# Patient Record
Sex: FEMALE | Employment: FULL TIME | URBAN - METROPOLITAN AREA
[De-identification: names, ages, dates, MRNs, and addresses within clinical notes are randomized per-mention and may not be internally consistent; named-entity substitution may affect disease eponyms.]

---

## 2017-10-27 ENCOUNTER — HOSPITAL ENCOUNTER (OUTPATIENT)
Dept: GENERAL RADIOLOGY | Age: 42
Discharge: HOME | End: 2017-10-27
Payer: COMMERCIAL

## 2017-10-27 ENCOUNTER — OFFICE VISIT (OUTPATIENT)
Dept: INTERNAL MEDICINE CLINIC | Age: 42
End: 2017-10-27

## 2017-10-27 VITALS
HEART RATE: 85 BPM | WEIGHT: 253 LBS | DIASTOLIC BLOOD PRESSURE: 75 MMHG | HEIGHT: 64 IN | SYSTOLIC BLOOD PRESSURE: 121 MMHG | OXYGEN SATURATION: 96 % | BODY MASS INDEX: 43.19 KG/M2 | TEMPERATURE: 98.2 F | RESPIRATION RATE: 22 BRPM

## 2017-10-27 DIAGNOSIS — M79.671 HEEL PAIN, BILATERAL: Primary | ICD-10-CM

## 2017-10-27 DIAGNOSIS — F10.20 UNCOMPLICATED ALCOHOL DEPENDENCE (HCC): ICD-10-CM

## 2017-10-27 DIAGNOSIS — R73.03 PRE-DIABETES: ICD-10-CM

## 2017-10-27 DIAGNOSIS — K75.81 NASH (NONALCOHOLIC STEATOHEPATITIS): ICD-10-CM

## 2017-10-27 DIAGNOSIS — F17.200 NICOTINE DEPENDENCE, UNCOMPLICATED, UNSPECIFIED NICOTINE PRODUCT TYPE: ICD-10-CM

## 2017-10-27 DIAGNOSIS — M79.672 HEEL PAIN, BILATERAL: ICD-10-CM

## 2017-10-27 DIAGNOSIS — M79.671 HEEL PAIN, BILATERAL: ICD-10-CM

## 2017-10-27 DIAGNOSIS — M79.672 HEEL PAIN, BILATERAL: Primary | ICD-10-CM

## 2017-10-27 PROCEDURE — 73630 X-RAY EXAM OF FOOT: CPT

## 2017-10-27 NOTE — PROGRESS NOTES
INTERNISTS [facility]:  11/3/2017, MRN: [id number]      Elizabeth Prado is a 46 y.o. female and presents to clinic for Foot Pain (bilatreral heal pain wants a referral)    Subjective: The patient is a 42-year-old female with history of diabetes, IBS, nicotine dependence (smoking since the age of 14, 1pack lasts 3 days), alcoholic beverage consumption (can drink up to 12 beers in one day) and ARCINIEGA. 1. Bilateral heel pain: The patient reports bilateral heel pain with application of pressure to her foot. It hurts to walk on a hard floor. Symptoms began in the past 2 weeks. She did a cancer walk just 2 weeks ago just before the onset of symptoms. At times, she feels that her ankle may give out. Although she has pain in both feet, her left foot hurts more than her right. She has no history of trauma or falls. Symptoms improved with walking/stretching. Pain is achy. This is never happened before. 2. Health maintenance: The patient has a history of smoking since the age of 14. She denies shortness of breath and cough. She also has a history of excessive alcohol beverage consumption, consuming up to 12 years and 1 day. She also has a history of prediabetes and ARCINIEGA >3 months. Her weight increased from December of last year. Her weight today is 253 pounds. She is not regularly exercising or adhering to a restricted diet.     Wt Readings from Last 3 Encounters:   10/27/17 253 lb (114.8 kg)   12/09/16 244 lb 6.4 oz (110.9 kg)   06/16/16 244 lb 6.4 oz (110.9 kg)       Patient Active Problem List    Diagnosis Date Noted    ARCINIEGA (nonalcoholic steatohepatitis) 02/10/2015    Alcohol dependence ([address] 02/10/2015    Pre-diabetes 02/10/2015    IBS (irritable bowel syndrome) 02/02/2015       No Known Allergies    Past Medical History:   Diagnosis Date    [facility]    Elevated LFTs     EtOH dependence ([address]     Fatty liver     Irritable bowel syndrome (IBS)     2012, normal colonoscopy    Smoker        Past Surgical History:   Procedure Laterality Date    HX APPENDECTOMY  age 18    HX CHOLECYSTECTOMY  20    HX COLONOSCOPY  2012    normal    HX GYN  2007    BTL       Family History   Problem Relation Age of Onset    Adopted: Yes    Diabetes Maternal Grandmother        Social History   Substance Use Topics    Smoking status: Current Some Day Smoker     Packs/day: 1.00     Years: 15.00     Types: Cigarettes    Smokeless tobacco: Never Used    Alcohol use 12.0 oz/week     24 Cans of beer per week      Comment: 6 pack 4 times a week       ROS   Review of Systems   Constitutional: Negative for chills and fever. HENT: Negative for ear pain and sore throat. Eyes: Negative for blurred vision and pain. Respiratory: Negative for shortness of breath. Cardiovascular: Negative for chest pain. Gastrointestinal: Negative for abdominal pain, blood in stool and melena. Genitourinary: Negative for dysuria and hematuria. Musculoskeletal: Positive for joint pain. Negative for myalgias. Skin: Negative for rash. Neurological: Negative for tingling, focal weakness and headaches. Endo/Heme/Allergies: Does not bruise/bleed easily. Psychiatric/Behavioral: Negative for substance abuse. Objective     Vitals:    10/27/17 1212   BP: 121/75   Pulse: 85   Resp: 22   Temp: 98.2 °F (36.8 °C)   SpO2: 96%   Weight: 253 lb (114.8 kg)   Height: 5' 4\" (1.626 m)   PainSc:   3   PainLoc: Foot   LMP: 10/02/2017       Physical Exam   Constitutional: She is oriented to person, place, and time and well-developed, well-nourished, and in no distress. HENT:   Head: Normocephalic and atraumatic. Right Ear: External ear normal.   Left Ear: External ear normal.   Nose: Nose normal.   Mouth/Throat: Oropharynx is clear and moist. No oropharyngeal exudate. Clear TMs   Eyes: Conjunctivae and EOM are normal. Right eye exhibits no discharge. Left eye exhibits no discharge.  No scleral icterus. Neck: Neck supple. Cardiovascular: Normal rate, regular rhythm, normal heart sounds and intact distal pulses. Exam reveals no gallop and no friction rub. No murmur heard. Pulmonary/Chest: Effort normal and breath sounds normal. No respiratory distress. She has no wheezes. She has no rales. Abdominal: Soft. Bowel sounds are normal. She exhibits no distension. There is no tenderness. There is no rebound and no guarding. Musculoskeletal: She exhibits no edema or tenderness (bue are NTTP). Pt has pain along b/l heels/calcaneus. No pain along her plantar fascia or Achilles tendon. Good passive ROM along feet. No effusions are present. Lymphadenopathy:     She has no cervical adenopathy. Neurological: She is alert and oriented to person, place, and time. She exhibits normal muscle tone. Gait normal.   Skin: Skin is warm and dry. No erythema. Psychiatric: Affect normal.   Nursing note and vitals reviewed.       LABS   Data Review:   Lab Results   Component Value Date/Time    WBC 7.3 06/15/2016 09:05 AM    HGB 14.2 06/15/2016 09:05 AM    HCT 42.4 06/15/2016 09:05 AM    PLATELET 502 99/77/5875 09:05 AM    MCV 94.9 06/15/2016 09:05 AM       Lab Results   Component Value Date/Time    Sodium 136 06/15/2016 09:05 AM    Potassium 4.2 06/15/2016 09:05 AM    Chloride 103 06/15/2016 09:05 AM    CO2 26 06/15/2016 09:05 AM    Anion gap 7 06/15/2016 09:05 AM    Glucose 110 06/15/2016 09:05 AM    BUN 10 06/15/2016 09:05 AM    Creatinine 0.72 06/15/2016 09:05 AM    BUN/Creatinine ratio 14 06/15/2016 09:05 AM    GFR est AA >60 06/15/2016 09:05 AM    GFR est non-AA >60 06/15/2016 09:05 AM    Calcium 8.9 06/15/2016 09:05 AM       Lab Results   Component Value Date/Time    Cholesterol, total 167 06/15/2016 09:05 AM    HDL Cholesterol 66 06/15/2016 09:05 AM    LDL, calculated 80.4 06/15/2016 09:05 AM    VLDL, calculated 20.6 06/15/2016 09:05 AM    Triglyceride 103 06/15/2016 09:05 AM    CHOL/HDL Ratio 2.5 06/15/2016 09:05 AM       Lab Results   Component Value Date/Time    Hemoglobin A1c 5.6 06/15/2016 09:05 AM       Assessment/Plan:   Heel pain, bilateral  -Ordering bilateral foot x-rays to rule out stress fracture.  -Placing a referral to the podiatry team for assistance  -Activity as tolerated. ORDERS:  - XR FOOT RT MIN 3 V; Future  - XR FOOT LT MIN 3 V; Future  - REFERRAL TO PODIATRY    2.  Health maintenance:  -I encourage patient to drink alcoholic beverages in moderation.  -I encouraged patient to stop smoking. We discussed briefly the health benefits.  -I encouraged patient to maintain a heart healthy low-carb diet with regular aerobic exercise as a means to reduce her weight and overall lifetime CVD risk.  -Checking an A1c, CMP, CBC, and lipid panel given history of ARCINIEGA, prediabetes, obesity, and alcohol beverage consumption. Health Maintenance Due   Topic Date Due    PAP AKA CERVICAL CYTOLOGY  03/01/2018     Lab review: labs are reviewed in the EHR    I have discussed the diagnosis with the patient and the intended plan as seen in the above orders. The patient has received an after-visit summary and questions were answered concerning future plans. I have discussed medication side effects and warnings with the patient as well. I have reviewed the plan of care with the patient, accepted their input and they are in agreement with the treatment goals. All questions were answered. The patient understands the plan of care. Handouts provided today with above information. Pt instructed if symptoms worsen to call the office or report to the ED for continued care. Greater than 50% of the visit time was spent in counseling and/or coordination of care. The patient was counseled on the dangers of tobacco use, and was advised to quit. Reviewed strategies to maximize success, including substitution of other forms of reinforcement.       Follow-up Disposition:  Return if symptoms worsen or fail to improve.     Serena Cedillo MD

## 2017-10-27 NOTE — MR AVS SNAPSHOT
Visit Information Date & Time Provider Department Dept. Phone Encounter #  
 10/27/2017 12:00 PM Kirk Neri MD [facility] [id number] Your Appointments 1/4/2018  7:50 AM  
LAB with [facility] NURSE VISIT [facility] [address], [address]  [address] [address]  
  
   
 [address], [address] Upcoming Health Maintenance Date Due  
 PAP AKA CERVICAL CYTOLOGY 3/1/2018 DTaP/Tdap/Td series (2 - Td) 10/27/2027 Allergies as of 10/27/2017  Review Complete On: 10/27/2017 By: Milena Kaplan LPN No Known Allergies Current Immunizations  Never Reviewed No immunizations on file. Not reviewed this visit You Were Diagnosed With   
  
 Codes Comments Heel pain, bilateral    -  Primary ICD-10-CM: M79.671, H33.205 ICD-9-CM: 729.5 Vitals BP Pulse Temp Resp Height(growth percentile) Weight(growth percentile) 121/75 85 98.2 °F (36.8 °C) 22 5' 4\" (1.626 m) 253 lb (114.8 kg) LMP SpO2 BMI OB Status Smoking Status 10/02/2017 96% 43.43 kg/m2 Having regular periods Current Some Day Smoker Vitals History BMI and BSA Data Body Mass Index Body Surface Area  
 43.43 kg/m 2 2.28 m 2 Preferred Pharmacy Pharmacy Name Phone [facility] [address] [place] [address] [phone] Your Updated Medication List  
  
   
This list is accurate as of: 10/27/17 12:58 PM.  Always use your most recent med list.  
  
  
  
  
 azithromycin 250 mg tablet Commonly known as:  Susan Malik Take 500mg (2 tabs) on Day 1 and then 250mg (1 tab) daily on Days 2-5.  
  
 benzonatate 100 mg capsule Commonly known as:  TESSALON Take 1 Cap by mouth three (3) times daily as needed for Cough.   
  
 THERAFLU COLD & SORE THROAT PO  
 Take  by mouth two (2) times daily as needed. We Performed the Following REFERRAL TO PODIATRY [REF90 Custom] To-Do List   
 10/27/2017 Imaging:  XR FOOT LT MIN 3 V   
  
 10/27/2017 Imaging:  XR FOOT RT MIN 3 V Referral Information Referral ID Referred By Referred To  
  
 [id number] Yu Paredes Not Available Visits Status Start Date End Date 1 New Request 10/27/17 10/27/18 If your referral has a status of pending review or denied, additional information will be sent to support the outcome of this decision. Introducing [facility]! Kathrine Walton introduces light patient portal. Now you can access parts of your medical record, email your doctor's office, and request medication refills online. 1. In your internet browser, go to [email or web address] 2. Click on the First Time User? Click Here link in the Sign In box. You will see the New Member Sign Up page. 3. Enter your light Access Code exactly as it appears below. You will not need to use this code after youve completed the sign-up process. If you do not sign up before the expiration date, you must request a new code. · light Access Code: [email or web address] Expires: 1/1/2018 10:45 AM 
 
4. Enter the last four digits of your Social Security Number (xxxx) and Date of Birth (mm/dd/yyyy) as indicated and click Submit. You will be taken to the next sign-up page. 5. Create a light ID. This will be your light login ID and cannot be changed, so think of one that is secure and easy to remember. 6. Create a light password. You can change your password at any time. 7. Enter your Password Reset Question and Answer. This can be used at a later time if you forget your password. 8. Enter your e-mail address. You will receive e-mail notification when new information is available in [address] 9. Click Sign Up.  You can now view and download portions of your medical record. 10. Click the Download Summary menu link to download a portable copy of your medical information. If you have questions, please visit the Frequently Asked Questions section of the [email or web address] website. Remember, [email or web address] is NOT to be used for urgent needs. For medical emergencies, dial 911. Now available from your iPhone and Android! Please provide this summary of care documentation to your next provider. Your primary care clinician is listed as Kelly Sarmiento. If you have any questions after today's visit, please call [phone].

## 2017-10-27 NOTE — ACP (ADVANCE CARE PLANNING)
1. Have you been to the ER, urgent care clinic since your last visit? Hospitalized since your last visit? No    2. Have you seen or consulted any other health care providers outside of the [address] [place] [address] since your last visit? Include any pap smears or colon screening.  No

## 2017-10-27 NOTE — PROGRESS NOTES
Pt notified of her results. Her podiatry apt is already scheduled for next week.      Dr. Mague Newton  [facility], [address] [place] [address] Str.  Phone: [phone]  Fax: [phone]

## 2017-11-03 PROBLEM — F17.200 NICOTINE DEPENDENCE: Status: ACTIVE | Noted: 2017-11-03

## 2017-12-06 ENCOUNTER — TELEPHONE (OUTPATIENT)
Dept: INTERNAL MEDICINE CLINIC | Age: 42
End: 2017-12-06

## 2017-12-06 NOTE — TELEPHONE ENCOUNTER
Patient calling says she has a cough that's driving her crazy, she cant sleep and the cough is all day long. Would like to know if JM will call her in a prescription for some cough syrup.   Pls Advise

## 2018-03-15 ENCOUNTER — OFFICE VISIT (OUTPATIENT)
Dept: INTERNAL MEDICINE CLINIC | Age: 43
End: 2018-03-15

## 2018-03-15 ENCOUNTER — HOSPITAL ENCOUNTER (OUTPATIENT)
Dept: LAB | Age: 43
Discharge: HOME | End: 2018-03-15
Payer: COMMERCIAL

## 2018-03-15 VITALS
WEIGHT: 251 LBS | RESPIRATION RATE: 18 BRPM | HEART RATE: 67 BPM | OXYGEN SATURATION: 96 % | SYSTOLIC BLOOD PRESSURE: 125 MMHG | HEIGHT: 64 IN | TEMPERATURE: 96.7 F | DIASTOLIC BLOOD PRESSURE: 78 MMHG | BODY MASS INDEX: 42.85 KG/M2

## 2018-03-15 DIAGNOSIS — Z13.220 SCREENING FOR HYPERLIPIDEMIA: ICD-10-CM

## 2018-03-15 DIAGNOSIS — J98.8 RESPIRATORY TRACT INFECTION: ICD-10-CM

## 2018-03-15 DIAGNOSIS — R73.03 PRE-DIABETES: ICD-10-CM

## 2018-03-15 DIAGNOSIS — K75.81 NASH (NONALCOHOLIC STEATOHEPATITIS): ICD-10-CM

## 2018-03-15 DIAGNOSIS — F17.200 NICOTINE DEPENDENCE, UNCOMPLICATED, UNSPECIFIED NICOTINE PRODUCT TYPE: ICD-10-CM

## 2018-03-15 DIAGNOSIS — J34.0 NOSE CELLULITIS: Primary | ICD-10-CM

## 2018-03-15 PROBLEM — E66.01 OBESITY, MORBID (HCC): Status: ACTIVE | Noted: 2018-03-15

## 2018-03-15 LAB
ALBUMIN SERPL-MCNC: 3.7 G/DL (ref 3.4–5)
ALBUMIN/GLOB SERPL: 1 {RATIO} (ref 0.8–1.7)
ALP SERPL-CCNC: 101 U/L (ref 45–117)
ALT SERPL-CCNC: 132 U/L (ref 13–56)
ANION GAP SERPL CALC-SCNC: 9 MMOL/L (ref 3–18)
AST SERPL-CCNC: 84 U/L (ref 15–37)
BASOPHILS # BLD: 0 K/UL (ref 0–0.06)
BASOPHILS NFR BLD: 0 % (ref 0–2)
BILIRUB SERPL-MCNC: 0.7 MG/DL (ref 0.2–1)
BUN SERPL-MCNC: 9 MG/DL (ref 7–18)
BUN/CREAT SERPL: 14 (ref 12–20)
CALCIUM SERPL-MCNC: 8.9 MG/DL (ref 8.5–10.1)
CHLORIDE SERPL-SCNC: 104 MMOL/L (ref 100–108)
CHOLEST SERPL-MCNC: 131 MG/DL
CO2 SERPL-SCNC: 26 MMOL/L (ref 21–32)
CREAT SERPL-MCNC: 0.64 MG/DL (ref 0.6–1.3)
DIFFERENTIAL METHOD BLD: ABNORMAL
EOSINOPHIL # BLD: 0.1 K/UL (ref 0–0.4)
EOSINOPHIL NFR BLD: 3 % (ref 0–5)
ERYTHROCYTE [DISTWIDTH] IN BLOOD BY AUTOMATED COUNT: 14 % (ref 11.6–14.5)
GLOBULIN SER CALC-MCNC: 3.8 G/DL (ref 2–4)
GLUCOSE SERPL-MCNC: 122 MG/DL (ref 74–99)
HCT VFR BLD AUTO: 44.6 % (ref 35–45)
HDLC SERPL-MCNC: 41 MG/DL (ref 40–60)
HDLC SERPL: 3.2 {RATIO} (ref 0–5)
HGB BLD-MCNC: 14.7 G/DL (ref 12–16)
LDLC SERPL CALC-MCNC: 68.2 MG/DL (ref 0–100)
LIPID PROFILE,FLP: NORMAL
LYMPHOCYTES # BLD: 2.2 K/UL (ref 0.9–3.6)
LYMPHOCYTES NFR BLD: 49 % (ref 21–52)
MCH RBC QN AUTO: 31.4 PG (ref 24–34)
MCHC RBC AUTO-ENTMCNC: 33 G/DL (ref 31–37)
MCV RBC AUTO: 95.3 FL (ref 74–97)
MONOCYTES # BLD: 0.4 K/UL (ref 0.05–1.2)
MONOCYTES NFR BLD: 9 % (ref 3–10)
NEUTS SEG # BLD: 1.8 K/UL (ref 1.8–8)
NEUTS SEG NFR BLD: 39 % (ref 40–73)
PLATELET # BLD AUTO: 175 K/UL (ref 135–420)
PMV BLD AUTO: 12.2 FL (ref 9.2–11.8)
POTASSIUM SERPL-SCNC: 4.1 MMOL/L (ref 3.5–5.5)
PROT SERPL-MCNC: 7.5 G/DL (ref 6.4–8.2)
RBC # BLD AUTO: 4.68 M/UL (ref 4.2–5.3)
SODIUM SERPL-SCNC: 139 MMOL/L (ref 136–145)
TRIGL SERPL-MCNC: 109 MG/DL (ref ?–150)
VLDLC SERPL CALC-MCNC: 21.8 MG/DL
WBC # BLD AUTO: 4.6 K/UL (ref 4.6–13.2)

## 2018-03-15 PROCEDURE — 85025 COMPLETE CBC W/AUTO DIFF WBC: CPT | Performed by: INTERNAL MEDICINE

## 2018-03-15 PROCEDURE — 36415 COLL VENOUS BLD VENIPUNCTURE: CPT | Performed by: INTERNAL MEDICINE

## 2018-03-15 PROCEDURE — 83036 HEMOGLOBIN GLYCOSYLATED A1C: CPT | Performed by: INTERNAL MEDICINE

## 2018-03-15 PROCEDURE — 80053 COMPREHEN METABOLIC PANEL: CPT | Performed by: INTERNAL MEDICINE

## 2018-03-15 PROCEDURE — 80061 LIPID PANEL: CPT | Performed by: INTERNAL MEDICINE

## 2018-03-15 RX ORDER — DOXYCYCLINE 100 MG/1
100 TABLET ORAL 2 TIMES DAILY
Qty: 20 TAB | Refills: 0 | Status: SHIPPED | OUTPATIENT
Start: 2018-03-15 | End: 2018-03-25

## 2018-03-15 NOTE — PROGRESS NOTES
[facility] [place] 2]:  3/15/2018, MRN: [id number]      Orion Shine is a 38 y.o. female and presents to clinic for Cold Symptoms (congestion cough sinus issues)    Subjective: The patient is a 42-year-old female with history of diabetes, IBS, nicotine dependence (smoking since the age of 14, 1 pack lasts 3 days), alcoholic beverage consumption (can drink up to 12 beers in one day) and ARCINIEGA. 1. ARCINIEGA and Prediabetes: Labs were ordered at her last appointment to assess underlying fatty liver disease and history of prediabetes. The patient never had these labs drawn. Her weight today is 251lbs. +Gaining weight. She continues to consume alcoholic beverages but only when she smokes cigarettes. 2. Smoking: The patient began smoking at the age of 14. At her last appointment, she stated that a pack of cigarettes will last her up to 3 days. A pack now lasts her a wk. 3. Respiratory Tract Infection: Her boyfriend is also \"sick\" with similar sx. She developed chills and a fever (101.8) on Sunday. +Nasal congestion. +SOB and cough (the cough is more so at night). +Fatigue. +Taking Dayquil and Nyquil. She initially had diarrhea but that resolved. Patient Active Problem List    Diagnosis Date Noted    Nicotine dependence 11/03/2017    ARCINIEGA (nonalcoholic steatohepatitis) 02/10/2015    Alcohol use 02/10/2015    Pre-diabetes 02/10/2015    IBS (irritable bowel syndrome) 02/02/2015       No Known Allergies    Past Medical History:   Diagnosis Date    [facility]ix     [facility]    Elevated LFTs     EtOH dependence ([facility] [address]     Fatty liver     Irritable bowel syndrome (IBS)     2012, normal colonoscopy    Smoker        Past Surgical History:   Procedure Laterality Date    HX APPENDECTOMY  age 17    HX CHOLECYSTECTOMY  20    HX COLONOSCOPY  2012    normal    HX GYN  2007    BTL       Family History   Problem Relation Age of Onset    Adopted:  Yes    Diabetes Maternal Grandmother        Social History   Substance Use Topics    Smoking status: Current Some Day Smoker     Packs/day: 1.00     Years: 15.00     Types: Cigarettes    Smokeless tobacco: Never Used    Alcohol use 12.0 oz/week     24 Cans of beer per week      Comment: 6 pack 4 times a week       ROS   Review of Systems   Constitutional: Positive for chills, fever and malaise/fatigue. Negative for weight loss. HENT: Positive for congestion and sinus pain. Negative for ear pain and sore throat. Eyes: Negative for blurred vision and pain. Respiratory: Positive for cough and shortness of breath. Cardiovascular: Negative for chest pain. Gastrointestinal: Negative for abdominal pain, blood in stool and melena. Genitourinary: Negative for dysuria and hematuria. Musculoskeletal: Negative for joint pain and myalgias. Skin: Negative for rash. Neurological: Negative for tingling, focal weakness and headaches. Endo/Heme/Allergies: Does not bruise/bleed easily. Psychiatric/Behavioral: Negative for substance abuse. Objective     Vitals:    03/15/18 0846   BP: 125/78   Pulse: 67   Resp: 18   Temp: 96.7 °F (35.9 °C)   SpO2: 96%   Weight: 251 lb (113.9 kg)   Height: 5' 4\" (1.626 m)   PainSc:   0 - No pain   LMP: 03/08/2018       Physical Exam   Constitutional: She is oriented to person, place, and time and well-developed, well-nourished, and in no distress. HENT:   Head: Normocephalic and atraumatic. Right Ear: External ear normal.   Left Ear: External ear normal.   Mouth/Throat: Oropharynx is clear and moist. No oropharyngeal exudate. +Nasal congestion. She has a small erythematous ulcer along her left nasal passage/nasal septum, TTP. +Sinus areas are NTTP   Eyes: Conjunctivae and EOM are normal. Pupils are equal, round, and reactive to light. Right eye exhibits no discharge. Left eye exhibits no discharge. No scleral icterus. Neck: Neck supple.    Cardiovascular: Normal rate, regular rhythm, normal heart sounds and intact distal pulses. Exam reveals no gallop and no friction rub. No murmur heard. Pulmonary/Chest: Effort normal and breath sounds normal. No respiratory distress. She has no wheezes. She has no rales. Abdominal: Soft. Bowel sounds are normal. She exhibits no distension. There is no tenderness. There is no rebound and no guarding. Musculoskeletal: She exhibits no edema or tenderness (BUE). Lymphadenopathy:     She has no cervical adenopathy. Neurological: She is alert and oriented to person, place, and time. She exhibits normal muscle tone. Gait normal.   Skin: Skin is warm and dry. No erythema. Psychiatric: Affect normal.   Nursing note and vitals reviewed. LABS   Data Review:   Lab Results   Component Value Date/Time    WBC 7.3 06/15/2016 09:05 AM    HGB 14.2 06/15/2016 09:05 AM    HCT 42.4 06/15/2016 09:05 AM    PLATELET 256 87/25/3899 09:05 AM    MCV 94.9 06/15/2016 09:05 AM       Lab Results   Component Value Date/Time    Sodium 136 06/15/2016 09:05 AM    Potassium 4.2 06/15/2016 09:05 AM    Chloride 103 06/15/2016 09:05 AM    CO2 26 06/15/2016 09:05 AM    Anion gap 7 06/15/2016 09:05 AM    Glucose 110 (H) 06/15/2016 09:05 AM    BUN 10 06/15/2016 09:05 AM    Creatinine 0.72 06/15/2016 09:05 AM    BUN/Creatinine ratio 14 06/15/2016 09:05 AM    GFR est AA >60 06/15/2016 09:05 AM    GFR est non-AA >60 06/15/2016 09:05 AM    Calcium 8.9 06/15/2016 09:05 AM       Lab Results   Component Value Date/Time    Cholesterol, total 167 06/15/2016 09:05 AM    HDL Cholesterol 66 (H) 06/15/2016 09:05 AM    LDL, calculated 80.4 06/15/2016 09:05 AM    VLDL, calculated 20.6 06/15/2016 09:05 AM    Triglyceride 103 06/15/2016 09:05 AM    CHOL/HDL Ratio 2.5 06/15/2016 09:05 AM       Lab Results   Component Value Date/Time    Hemoglobin A1c 5.6 06/15/2016 09:05 AM       Assessment/Plan:   1. Nicotine dependence: 1 pack lasts 1 wk.   -I counseled the patient on the importance of smoking cessation. 2. ARCINIEGA (nonalcoholic steatohepatitis) and Prediabetes:   -Checking a CMP, CBC, lipid panel, and an A1c.  -I encouraged patient to cease all alcohol beverage consumption. I will recheck her weight at her follow-up appointment. ORDERS:  - METABOLIC PANEL, COMPREHENSIVE; Future  - CBC WITH AUTOMATED DIFF; Future  - LIPID PANEL; Future  - HEMOGLOBIN A1C W/O EAG; Future    3. Respiratory tract infection and questionable cellulitis along her nostril: She likely has influenza infection but is out of the therapeutic window.  -I instructed the patient not to pick her nose at all. She is to let it heal naturally.  -Prescribing a course of doxycycline given questionable area of cellulitis along her nostril.  -Return to clinic if symptoms do not improve or if they worsen.  -Hydration. Health Maintenance Due   Topic Date Due    PAP AKA CERVICAL CYTOLOGY  03/01/2018     Lab review: labs are reviewed in the EHR    I have discussed the diagnosis with the patient and the intended plan as seen in the above orders. The patient has received an after-visit summary and questions were answered concerning future plans. I have discussed medication side effects and warnings with the patient as well. I have reviewed the plan of care with the patient, accepted their input and they are in agreement with the treatment goals. All questions were answered. The patient understands the plan of care. Handouts provided today with above information. Pt instructed if symptoms worsen to call the office or report to the ED for continued care. Greater than 50% of the visit time was spent in counseling and/or coordination of care. Follow-up Disposition:  Return in about 6 months (around 9/15/2018) for ARCINIEGA, prediabetes, weight check.     Jermaine Schmidt MD

## 2018-03-15 NOTE — PATIENT INSTRUCTIONS

## 2018-03-15 NOTE — PROGRESS NOTES
1. Have you been to the ER, urgent care clinic since your last visit? Hospitalized since your last visit? No    2. Have you seen or consulted any other health care providers outside of the [address] [place] [address] since your last visit? Include any pap smears or colon screening.  No

## 2018-03-15 NOTE — MR AVS SNAPSHOT
[address] [place] [id number] 
[phone] Patient: Duncan Funk MRN: [id number] :1975 Visit Information Date & Time Provider Department Dept. Phone Encounter #  
 3/15/2018  8:30 AM Koko Newman MD [facility] [address] [place] [address]  Follow-up Instructions Return in about 6 months (around 9/15/2018) for ARCINIEGA, prediabetes, weight check. Upcoming Health Maintenance Date Due  
 PAP AKA CERVICAL CYTOLOGY 3/1/2018 DTaP/Tdap/Td series (2 - Td) 10/27/2027 Allergies as of 3/15/2018  Review Complete On: 3/15/2018 By: Taz Phelan LPN No Known Allergies Current Immunizations  Never Reviewed No immunizations on file. Not reviewed this visit You Were Diagnosed With   
  
 Codes Comments ARCINIEGA (nonalcoholic steatohepatitis)    -  Primary ICD-10-CM: C41.76 ICD-9-CM: 571.8 Nicotine dependence, uncomplicated, unspecified nicotine product type     ICD-10-CM: F17.200 ICD-9-CM: 305.1 Pre-diabetes     ICD-10-CM: R73.03 
ICD-9-CM: 790.29 Screening for hyperlipidemia     ICD-10-CM: Z13.220 ICD-9-CM: V77.91 Acute non-recurrent maxillary sinusitis     ICD-10-CM: J01.00 ICD-9-CM: 461.0 Vitals BP Pulse Temp Resp Height(growth percentile) Weight(growth percentile) 125/78 67 96.7 °F (35.9 °C) 18 5' 4\" (1.626 m) 251 lb (113.9 kg) LMP SpO2 BMI OB Status Smoking Status 2018 96% 43.08 kg/m2 Having regular periods Current Some Day Smoker Vitals History BMI and BSA Data Body Mass Index Body Surface Area 43.08 kg/m 2 2.27 m 2 Preferred Pharmacy Pharmacy Name Phone [facility] 16 214 [facility] [phone] Your Updated Medication List  
  
   
This list is accurate as of 3/15/18  9:04 AM.  Always use your most recent med list.  
  
  
  
  
 doxycycline 100 mg tablet Commonly known as:  ADOXA Take 1 Tab by mouth two (2) times a day for 10 days. Prescriptions Sent to Pharmacy Refills  
 doxycycline (ADOXA) 100 mg tablet 0 Sig: Take 1 Tab by mouth two (2) times a day for 10 days. Class: Normal  
 Pharmacy: [facility] [address] [place] [address] Morena Ramirez 16 [address] [place] [address] #: [phone] Route: Oral  
  
Follow-up Instructions Return in about 6 months (around 9/15/2018) for ARCINIEGA, prediabetes, weight check. To-Do List   
 03/15/2018 Lab:  CBC WITH AUTOMATED DIFF Around 03/15/2018 Lab:  HEMOGLOBIN A1C W/O EAG   
  
 03/15/2018 Lab:  LIPID PANEL   
  
 03/15/2018 Lab:  METABOLIC PANEL, COMPREHENSIVE Patient Instructions Cough: Care Instructions Your Care Instructions A cough is your body's response to something that bothers your throat or airways. Many things can cause a cough. You might cough because of a cold or the flu, bronchitis, or asthma. Smoking, postnasal drip, allergies, and stomach acid that backs up into your throat also can cause coughs. A cough is a symptom, not a disease. Most coughs stop when the cause, such as a cold, goes away. You can take a few steps at home to cough less and feel better. Follow-up care is a key part of your treatment and safety. Be sure to make and go to all appointments, and call your doctor if you are having problems. It's also a good idea to know your test results and keep a list of the medicines you take. How can you care for yourself at home? · Drink lots of water and other fluids. This helps thin the mucus and soothes a dry or sore throat. Honey or lemon juice in hot water or tea may ease a dry cough. · Take cough medicine as directed by your doctor. · Prop up your head on pillows to help you breathe and ease a dry cough. · Try cough drops to soothe a dry or sore throat.  Cough drops don't stop a cough. Medicine-flavored cough drops are no better than candy-flavored drops or hard candy. · Do not smoke. Avoid secondhand smoke. If you need help quitting, talk to your doctor about stop-smoking programs and medicines. These can increase your chances of quitting for good. When should you call for help? Call 911 anytime you think you may need emergency care. For example, call if: 
? · You have severe trouble breathing. ?Call your doctor now or seek immediate medical care if: 
? · You cough up blood. ? · You have new or worse trouble breathing. ? · You have a new or higher fever. ? · You have a new rash. ? Watch closely for changes in your health, and be sure to contact your doctor if: 
? · You cough more deeply or more often, especially if you notice more mucus or a change in the color of your mucus. ? · You have new symptoms, such as a sore throat, an earache, or sinus pain. ? · You do not get better as expected. Where can you learn more? Go to http://stalin-jarocho.info/. Enter D279 in the search box to learn more about \"Cough: Care Instructions. \" Current as of: May 12, 2017 Content Version: 11.4 © 9609-4840 [employer]. Care instructions adapted under license by [employer] (which disclaims liability or warranty for this information). If you have questions about a medical condition or this instruction, always ask your healthcare professional. [place] [address] any warranty or liability for your use of this information. Introducing [facility] & HEALTH SERVICES! [address] introduces Txt4 patient portal. Now you can access parts of your medical record, email your doctor's office, and request medication refills online. 1. In your internet browser, go to [email or web address] 2. Click on the First Time User? Click Here link in the Sign In box. You will see the New Member Sign Up page. 3. Enter your [employer] Access Code exactly as it appears below. You will not need to use this code after youve completed the sign-up process. If you do not sign up before the expiration date, you must request a new code. · [employer] Access Code: [email or web address] Expires: 6/13/2018  9:04 AM 
 
4. Enter the last four digits of your Social Security Number (xxxx) and Date of Birth (mm/dd/yyyy) as indicated and click Submit. You will be taken to the next sign-up page. 5. Create a [employer] ID. This will be your [employer] login ID and cannot be changed, so think of one that is secure and easy to remember. 6. Create a [employer] password. You can change your password at any time. 7. Enter your Password Reset Question and Answer. This can be used at a later time if you forget your password. 8. Enter your e-mail address. You will receive e-mail notification when new information is available in [address] 9. Click Sign Up. You can now view and download portions of your medical record. 10. Click the Download Summary menu link to download a portable copy of your medical information. If you have questions, please visit the Frequently Asked Questions section of the [employer] website. Remember, [employer] is NOT to be used for urgent needs. For medical emergencies, dial 911. Now available from your iPhone and Android! Please provide this summary of care documentation to your next provider. Your primary care clinician is listed as Miquel Dsouza. If you have any questions after today's visit, please call [phone].

## 2018-03-16 LAB — HBA1C MFR BLD: 5.9 % (ref 4.2–5.6)

## 2018-03-16 NOTE — PROGRESS NOTES
Please let the pt know that her prediabetes is still present. Her A1C is 5.9. She has normal kidney function but her liver function is abnormal and has worsened. This is likely from ARCINIEGA/fatty liver disease. Her blood counts are normal. Please schedule her for a f/u apt to discuss.     Dr. Mony Christopher  [facility] [address], [address] [place] [address] Str.  Phone: [phone]  Fax: [phone]

## 2018-08-16 ENCOUNTER — OFFICE VISIT (OUTPATIENT)
Dept: INTERNAL MEDICINE CLINIC | Age: 43
End: 2018-08-16

## 2018-08-16 VITALS
SYSTOLIC BLOOD PRESSURE: 120 MMHG | RESPIRATION RATE: 18 BRPM | OXYGEN SATURATION: 100 % | HEIGHT: 64 IN | WEIGHT: 263.2 LBS | DIASTOLIC BLOOD PRESSURE: 71 MMHG | TEMPERATURE: 97.9 F | BODY MASS INDEX: 44.94 KG/M2 | HEART RATE: 72 BPM

## 2018-08-16 DIAGNOSIS — Z78.9 ALCOHOL USE: ICD-10-CM

## 2018-08-16 DIAGNOSIS — F17.200 NICOTINE DEPENDENCE, UNCOMPLICATED, UNSPECIFIED NICOTINE PRODUCT TYPE: ICD-10-CM

## 2018-08-16 DIAGNOSIS — R73.03 PRE-DIABETES: ICD-10-CM

## 2018-08-16 DIAGNOSIS — K75.81 NASH (NONALCOHOLIC STEATOHEPATITIS): ICD-10-CM

## 2018-08-16 DIAGNOSIS — E66.01 OBESITY, MORBID (HCC): ICD-10-CM

## 2018-08-16 DIAGNOSIS — M79.672 LEFT FOOT PAIN: Primary | ICD-10-CM

## 2018-08-16 RX ORDER — NAPROXEN 500 MG/1
500 TABLET ORAL 2 TIMES DAILY WITH MEALS
COMMUNITY
Start: 2018-06-23 | End: 2019-06-06 | Stop reason: ALTCHOICE

## 2018-08-16 RX ORDER — TRAMADOL HYDROCHLORIDE AND ACETAMINOPHEN 37.5; 325 MG/1; MG/1
1 TABLET ORAL
COMMUNITY
Start: 2018-06-23 | End: 2019-06-06 | Stop reason: ALTCHOICE

## 2018-08-16 NOTE — MR AVS SNAPSHOT
[facility] 
 
 
 [address], [facility] [address] [facility] 
[phone] Patient: Romero Barrera MRN: [id number] :1975 Visit Information Date & Time Provider Department Dept. Phone Encounter #  
 2018 10:00 AM Dexter Ling MD [facility] [address] [place] [address] [phone] Follow-up Instructions Return in about 5 months (around 2019) for BP check, weight check, labs, prediabetes. Your Appointments 2019  9:35 AM  
LAB with [facility] NURSE VISIT [facility] [address] [place] [address] ([facility]) Appt Note: fasting labs [address], [facility] [address] Limb [address]  
  
   
 [address], [address]  
  
    
 2019  8:30 AM  
Office Visit with Dexter Ling MD  
[facility] of [address] [place] [address]) Appt Note: 5 month f/u  
 [address] [address] [address]  
  
   
 [address], [address] Upcoming Health Maintenance Date Due Influenza Age 5 to Adult 2018 PAP AKA CERVICAL CYTOLOGY 2020 DTaP/Tdap/Td series (2 - Td) 10/27/2027 Allergies as of 2018  Review Complete On: 2018 By: Shawna Dinh No Known Allergies Current Immunizations  Never Reviewed No immunizations on file. Not reviewed this visit Vitals BP Pulse Temp Resp Height(growth percentile) Weight(growth percentile) 120/71 (BP 1 Location: Left arm, BP Patient Position: Sitting) 72 97.9 °F (36.6 °C) (Oral) 18 5' 4\" (1.626 m) 263 lb 3.2 oz (119.4 kg) LMP SpO2 BMI OB Status Smoking Status 08/15/2018 100% 45.18 kg/m2 Having regular periods Current Some Day Smoker BMI and BSA Data Body Mass Index Body Surface Area  
 45.18 kg/m 2 2.32 m 2 Preferred Pharmacy Pharmacy Name Phone [facility] 5 [facility] Morena Ramirez [address] [place] [address] [phone] Your Updated Medication List  
  
   
This list is accurate as of 8/16/18 10:35 AM.  Always use your most recent med list.  
  
  
  
  
 naproxen 500 mg tablet Commonly known as:  NAPROSYN Take 500 mg by mouth two (2) times daily (with meals). traMADol-acetaminophen 37.5-325 mg per tablet Commonly known as:  ULTRACET Take 1 Tab by mouth every eight (8) hours as needed. Follow-up Instructions Return in about 5 months (around 1/21/2019) for BP check, weight check, labs, prediabetes. Patient Instructions Health Maintenance Due Topic Date Due  Influenza Age 5 to Adult  08/01/2018 Patient was given a copy of the Advanced Medical Directive form and understands to bring it in once completed. Introducing [facility] & HEALTH SERVICES! [employer] introduces [employer] ([employer]) patient portal. Now you can access parts of your medical record, email your doctor's office, and request medication refills online. 1. In your internet browser, go to [email or web address] 2. Click on the First Time User? Click Here link in the Sign In box. You will see the New Member Sign Up page. 3. Enter your [employer] ([employer]C) Access Code exactly as it appears below. You will not need to use this code after youve completed the sign-up process. If you do not sign up before the expiration date, you must request a new code. · [employer] ([employer]C) Access Code: [email or web address] Expires: 11/14/2018 10:35 AM 
 
4. Enter the last four digits of your Social Security Number (xxxx) and Date of Birth (mm/dd/yyyy) as indicated and click Submit. You will be taken to the next sign-up page. 5. Create a [employer] ([employer]C) ID. This will be your [employer] ([employer]C) login ID and cannot be changed, so think of one that is secure and easy to remember. 6. Create a [employer] ([employer]C) password. You can change your password at any time. 7. Enter your Password Reset Question and Answer. This can be used at a later time if you forget your password. 8. Enter your e-mail address. You will receive e-mail notification when new information is available in [address] 9. Click Sign Up. You can now view and download portions of your medical record. 10. Click the Download Summary menu link to download a portable copy of your medical information. If you have questions, please visit the Frequently Asked Questions section of the AisleFinder website. Remember, AisleFinder is NOT to be used for urgent needs. For medical emergencies, dial 911. Now available from your iPhone and Android! Please provide this summary of care documentation to your next provider. Your primary care clinician is listed as Collette Hopson. If you have any questions after today's visit, please call [phone].

## 2018-08-16 NOTE — PATIENT INSTRUCTIONS
Health Maintenance Due   Topic Date Due    Influenza Age 5 to Adult  08/01/2018     Patient was given a copy of the Advanced Medical Directive form and understands to bring it in once completed.

## 2018-08-16 NOTE — PROGRESS NOTES
[facility] OF [facility]:   Preoperative Evaluation    Date of Exam: 08/16/18    MRN: [id number]    Melba George  Is a 38 y.o.  female  who presents for preoperative evaluation. Chief Complaint   Patient presents with    Pre-op Exam     Podiatry Surgery on Left Foot with [id number] Dr Yariel Hanna scheduled 9-10-18       Subjective: The patient is a 82-year-old female with history of diabetes, IBS, nicotine dependence (smoking since the age of 14, 1 pack lasts 3 days), alcoholic beverage consumption (can drink up to 12 beers in one day) and ARCINIEGA. 1. Left foot surgery: S/p fall while walking over a tree root. She has persistent pain along her left foot. No alleviating factors are known. She is taking tramadol-acetaminophen and naproxen as needed. She reports no adverse side effects from his medications. General Information:  Procedure/Surgery: Left Foot Surgery  Date of Procedure/Surgery:9/10/18  Surgeon: [address] [place] [address]  Primary Physician: Consuello Barthel, MD  Surgery status: Elective  Surgery risk: Intermediate (head/neck, intraperitoneal, intrathoracic, orthopedic, and prostate    Cardiovascular Risk Factors:  1. Coronary revascularization within 5 years: no  2. Recurrent chest pain: no  3. Shortness of breath:  no  4. Recent coronary evaluation/stress test/angiogram:  no  5. Recent MI (less than 1 month ago):  no  6. Prior MI (by way of history or pathological waves):  no  7. Compensated CHF or h/o CHF:  no  8. Severe valvular disease:  no  9. Decompensated CHF:  no  10. High-grade atrioventricular block:  no  11. Arrhythmia:  no    Other Risk Factors:  1. Diabetes hx:  no  2. H/o CVA:  no  3. Uncontrolled hypertension:  no  4, Advanced age:  no  5. Low functional capacity:  no  6. Recent use of: No recent use of aspirin (ASA), NSAIDS or steroids  7. Tetanus up to date: tetanus status unknown to the patient  8. Anesthesia Complications: None  9. History of abnormal bleeding : None  10.  History of Blood Transfusions: no  11. Health Care Directive or Living Will: no  12. Latex Allergy: no    2. Smoking: Since age 14. She is smoking 1 pack per wk. She is asymptomatic, denying shortness of breath and cough symptoms. 3. ETOH intake: She is still consuming alcoholic beverages, drinking a case of beer per week - but all on weekends. No drug use. No energy drink consumption. 4.  Obesity/Prediabetes/ARCINIEGA: Patient's weight has increased dramatically since her last appointment. She is not dieting. She states her weight gain has been secondary to inactivity from her left foot injury mentioned above. Her weight today is 263 pounds. Meanwhile, she has a history of prediabetes and ARCINIEGA. See #3 regarding her ETOH intake. Wt Readings from Last 3 Encounters:   08/16/18 263 lb 3.2 oz (119.4 kg)   03/15/18 251 lb (113.9 kg)   10/27/17 253 lb (114.8 kg)         Problem List:     Patient Active Problem List    Diagnosis Date Noted    Obesity, morbid ([facility] [address] 03/15/2018    Nicotine dependence 11/03/2017    ARCINIEGA (nonalcoholic steatohepatitis) 02/10/2015    Alcohol use 02/10/2015    Pre-diabetes 02/10/2015    IBS (irritable bowel syndrome) 02/02/2015     Medical History:     Past Medical History:   Diagnosis Date    Abnormal Pap smear of cervix     [facility]    Elevated LFTs     EtOH dependence ([facility] [address]     Fatty liver     Irritable bowel syndrome (IBS)     2012, normal colonoscopy    Smoker      Allergies:   No Known Allergies   Medications:     Current Outpatient Prescriptions   Medication Sig    traMADol-acetaminophen (ULTRACET) 37.5-325 mg per tablet Take 1 Tab by mouth every eight (8) hours as needed.  naproxen (NAPROSYN) 500 mg tablet Take 500 mg by mouth two (2) times daily (with meals). No current facility-administered medications for this visit.       Surgical History:     Past Surgical History:   Procedure Laterality Date    HX APPENDECTOMY  age 18    [facility] COLONOSCOPY  2012    normal    HX GYN  2007    BTL     Social History:     Social History     Social History    Marital status: UNKNOWN     Spouse name: N/A    Number of children: N/A    Years of education: N/A     Occupational History          Social History Main Topics    Smoking status: Current Some Day Smoker     Packs/day: 1.00     Years: 15.00     Types: Cigarettes    Smokeless tobacco: Never Used    Alcohol use 12.0 oz/week     24 Cans of beer per week      Comment: 6 pack 4 times a week    Drug use: Yes     Special: Prescription, OTC    Sexual activity: Yes     Partners: Male     Other Topics Concern    None     Social History Narrative         REVIEW OF SYSTEMS:  Review of Systems   Constitutional: Negative for chills and fever. HENT: Negative for ear pain and sore throat. Eyes: Negative for blurred vision and pain. Respiratory: Negative for cough and shortness of breath. Cardiovascular: Negative for chest pain. Gastrointestinal: Negative for abdominal pain, blood in stool and melena. Genitourinary: Negative for dysuria and hematuria. Musculoskeletal: Positive for joint pain. Negative for myalgias. Skin: Negative for rash. Neurological: Negative for tingling, focal weakness and headaches. Endo/Heme/Allergies: Does not bruise/bleed easily. Psychiatric/Behavioral: Negative for substance abuse. Objective:     Vitals:    08/16/18 1008   BP: 120/71   Pulse: 72   Resp: 18   Temp: 97.9 °F (36.6 °C)   TempSrc: Oral   SpO2: 100%   Weight: 263 lb 3.2 oz (119.4 kg)   Height: 5' 4\" (1.626 m)   PainSc:   0 - No pain   PainLoc: Foot   LMP: 08/15/2018       Physical Exam   Constitutional: She is oriented to person, place, and time and well-developed, well-nourished, and in no distress. HENT:   Head: Normocephalic and atraumatic.    Right Ear: External ear normal.   Left Ear: External ear normal.   Nose: Nose normal.   Mouth/Throat: Oropharynx is clear and moist. No oropharyngeal exudate. Eyes: Conjunctivae and EOM are normal. Pupils are equal, round, and reactive to light. Right eye exhibits no discharge. Left eye exhibits no discharge. No scleral icterus. Neck: Neck supple. Cardiovascular: Normal rate, regular rhythm, normal heart sounds and intact distal pulses. Exam reveals no gallop and no friction rub. No murmur heard. Pulmonary/Chest: Effort normal and breath sounds normal. No respiratory distress. She has no wheezes. She has no rales. Abdominal: Soft. Bowel sounds are normal. She exhibits no distension. There is no tenderness. There is no rebound and no guarding. Musculoskeletal: She exhibits no edema or tenderness (BUE). Her left foot is in a boot   Lymphadenopathy:     She has no cervical adenopathy. Neurological: She is alert and oriented to person, place, and time. She exhibits normal muscle tone. Gait normal.   Skin: Skin is warm and dry. No erythema. Psychiatric: Affect normal.   Nursing note and vitals reviewed. DIAGNOSTICS:   Lab Results   Component Value Date/Time    Sodium 139 03/15/2018 09:09 AM    Potassium 4.1 03/15/2018 09:09 AM    Chloride 104 03/15/2018 09:09 AM    CO2 26 03/15/2018 09:09 AM    Anion gap 9 03/15/2018 09:09 AM    Glucose 122 (H) 03/15/2018 09:09 AM    BUN 9 03/15/2018 09:09 AM    Creatinine 0.64 03/15/2018 09:09 AM    BUN/Creatinine ratio 14 03/15/2018 09:09 AM    GFR est AA >60 03/15/2018 09:09 AM    GFR est non-AA >60 03/15/2018 09:09 AM    Calcium 8.9 03/15/2018 09:09 AM     Lab Results   Component Value Date/Time    WBC 4.6 03/15/2018 09:09 AM    HGB 14.7 03/15/2018 09:09 AM    HCT 44.6 03/15/2018 09:09 AM    PLATELET 845 23/67/0992 09:09 AM    MCV 95.3 03/15/2018 09:09 AM     Lab Results   Component Value Date/Time    Hemoglobin A1c 5.9 (H) 03/15/2018 09:09 AM     Lab Results   Component Value Date/Time    ALT (SGPT) 132 (H) 03/15/2018 09:09 AM    AST (SGOT) 84 (H) 03/15/2018 09:09 AM    Alk.  phosphatase 101 03/15/2018 09:09 AM    Bilirubin, direct 0.1 06/04/2010 12:55 PM    Bilirubin, total 0.7 03/15/2018 09:09 AM       Assessment/Plan:   1. Nicotine Dependence: Smoking a pack per wk. -I encouraged her to replace smoking with a healthier alternative behavior. 2. ETOH Use: Drinking 12 beers on the weekend.  -I encouraged her to reduce the amount of alcoholic beverage consumption on weekends. I instructed her not to consume more than 2 alcoholic units per 24 hour period. We discussed the side effects of excessive alcoholic beverage consumption. All questions were answered. 3.  Obesity: Her weight is increasing. Her weight is 263 pounds today. +H/o Prediabetes and ARCINIEGA  -I encouraged her to focus mainly on making healthy dietary choices. I encouraged her to reduce her portion size by 50%. I will recheck her weight at her follow-up appointment. -Given her history of prediabetes and ARCINIEGA, I will check an A1c and CMP just before her follow-up appointment. 4.  Left foot pain: Status post fall.  -I encouraged her to stop all NSAID use prior to her procedure. Preoperative Assessment: No contraindications to planned surgery   Orders/studies that need to be obtained prior to surgical clearance: medical clearance has been obtained    Pt is to undergo a low risk procedure with a low cardiac risk based on current history. Labs and imaging within acceptable range. No contraindications to planned surgery. Discontinue NSAIDS 1 week prior to surgical procedure. Follow up as scheduled post operatively. I have discussed the plan of care with the patient. The patient has received an after-visit summary and questions were answered concerning future plans. I have discussed medication side effects and warnings with the patient as well. All questions were answered. The patient understands the plan of care. Handouts provided today with the above information.  Pt instructed if symptoms worsen to call the office or report to the ED for continued care. Greater than 50% of the visit time was spent in counseling and/or coordination of care. The patient was counseled on the dangers of tobacco use, and was advised to quit. Reviewed strategies to maximize success, including removing cigarettes and smoking materials from environment and substitution of other forms of reinforcement.       Dr. Analisa Franco  [facility] of [address]  Phone: [phone]  Fax: [phone]

## 2018-08-16 NOTE — PROGRESS NOTES
Chief Complaint   Patient presents with    Pre-op Exam     Podiatry Surgery on Left Foot with [id number] Dr Zeeshan Terrell scheduled 9-10-18     Patient was given a copy of the Advanced Medical Directive form and understands to bring it in once completed. 1. Have you been to the ER, urgent care clinic since your last visit? Hospitalized since your last visit? Yes When: 6-23-18 Where: [facility] Reason: due to Left Foot Injury, running to garage from the back yard,  and stepped down wrong on a Root in the Back yard, she fell and injured the Left Foot. 2. Have you seen or consulted any other health care providers outside of the [address] [place] [address] [place] since your last visit? Include any pap smears or colon screening.  No

## 2018-09-04 ENCOUNTER — TELEPHONE (OUTPATIENT)
Dept: INTERNAL MEDICINE CLINIC | Age: 43
End: 2018-09-04

## 2018-09-04 NOTE — TELEPHONE ENCOUNTER
Chief Complaint   Patient presents with    Documentation     per Dr Ewa Oliveros her Pre Op Clearance Notes 8-16-18 have been faxed to [email or web address]      All documentation has been faxed to [email or web address] fax [phone].

## 2018-09-05 ENCOUNTER — HOSPITAL ENCOUNTER (OUTPATIENT)
Dept: LAB | Age: 43
Discharge: HOME | End: 2018-09-05

## 2018-09-05 LAB — XX-LABCORP SPECIMEN COL,LCBCF: NORMAL

## 2018-09-05 PROCEDURE — 99001 SPECIMEN HANDLING PT-LAB: CPT | Performed by: PODIATRIST

## 2018-11-02 ENCOUNTER — HOSPITAL ENCOUNTER (OUTPATIENT)
Dept: PHYSICAL THERAPY | Age: 43
Discharge: HOME | End: 2018-11-02
Payer: COMMERCIAL

## 2018-11-02 PROCEDURE — 97116 GAIT TRAINING THERAPY: CPT

## 2018-11-02 PROCEDURE — 97110 THERAPEUTIC EXERCISES: CPT

## 2018-11-02 PROCEDURE — 97161 PT EVAL LOW COMPLEX 20 MIN: CPT

## 2018-11-02 NOTE — PROGRESS NOTES
[facility] [address]  [address] [facility] [address], [address] [phone] fax  PLAN OF CARE / [address] [place] [address] PHYSICAL THERAPY SERVICES  Patient Name: Hermelindo Flores : 1975   Medical   Diagnosis: Pain in left ankle [M25.572] Treatment Diagnosis: Pain in left ankle [M25.572]   Onset Date: 9/10/18     Referral Source: Artemio Baires DPM Start of Care [facility]): 2018   Prior Hospitalization: See medical history Provider #: [id number]   Prior Level of Function: Independent ambulator   Comorbidities: S/p left ankle ORIF for Lisfranc fx 9/10/18; appendectomy; gallbladder removal; tubal ligation; left ankle fx in    Medications: Verified on Patient Summary List   The Plan of Care and following information is based on the information from the initial evaluation.   ========================================================================  Assessment / key information: Patient is a 38 y.o. female who presents to [facility] with a diagnosis of Pain in left ankle [M25.572]. Patient is her own historian. Living situation is as follows: lives with partner one story house with 3 CLAUDIA. Occupation: works in a bank. Pt presents s/p left Lisfranc fracture ORIF on 9/10/18 after falling over a tree root in early . Pt was NWBing LLE with CAM boot and foot scooter up until she saw her MD last wk. She has since been upgraded to [address] LLE with the CAM boot and is using a standard walker in and out of the house. She dons a left LE compression sleeve when ambulating to assist with a reduction in edema. She c/o numbness along the ventral surface of the left foot. Current Deficits include: pain, edema, decreased mobility, decreased strength, and decreased postural awareness with resulting limitations in ADL's and in functional abilities.      Impairments are as follows:     Pain 1/10 VAS ventral surface of left foot along incision; pt c/o numbness along the central surface of the left foot in no specific dermatomal pattern    Observations incision clean dry intact without s/s of infection; no erythema     Posture BLE genu recurvatum and valgus    Gait antalgic with FWBing LLE with CAM boot and step to gait with standard walker    AROM/PROM in degrees  DF R 10/15 L -5/0  PF R 40/45 L 40/43  Eversion R 15/20 L 15/18  Inversion R 30/34 L 10/12    Strength   Right ankle 5/5  Left ankle 3-/5 throughout   Left extensor hallucis longus 3-/5    Special Tests   Circumferential measures:  Mid gastrocs  L 32cm  R 62cm  Figure 8: L 65.5cm R 62cm  Negative Homans  LLE grossly intact throughout to LT and proprioception    Functional Tests   Unable to squat without valgus collapse     Functional Deficits include: unable to ambulate without AD. Patient's FOTO score was a 45/100 indicating decreased function.  Patient will benefit from a POC addressing such impairments and limitations in order to improve quality of life and return to PLOF.    ========================================================================  Eval Complexity: History: HIGH Complexity :3+ comorbidities / personal factors will impact the outcome/ POC Exam:MEDIUM Complexity : 3 Standardized tests and measures addressing body structure, function, activity limitation and / or participation in recreation  Presentation: LOW Complexity : Stable, uncomplicated  Clinical Decision Making:MEDIUM Complexity : FOTO score of 26-74Overall Complexity:LOW   Problem List: pain affecting function, decrease ROM, decrease strength, edema affecting function, impaired gait/ balance, decrease ADL/ functional abilitiies, decrease activity tolerance, decrease flexibility/ joint mobility and decrease transfer abilities   Treatment Plan may include any combination of the following: Therapeutic exercise, Therapeutic activities, Neuromuscular re-education, Physical agent/modality, Gait/balance training, Manual therapy and Patient education  Patient / Family readiness to learn indicated by: asking questions, trying to perform skills and interest  Persons(s) to be included in education: patient (P)  Barriers to Learning/Limitations: None  Measures taken: A HEP was initiated to assist with POC in restoring function; gait training with axillary crutches and SC   Patient Goal (s): \"walk normal\"   Patient self reported health status: good  Rehabilitation Potential: excellent   Short Term Goals: To be accomplished in  2  treatments:  1. Pt will be compliant with HEP for symptom management at home.  Long Term Goals: To be accomplished in  8-12  treatments:  1. Pt will demonstrate an increased FOTO score to 65/100 in order to improve function  2. Pt will be independent with HEP at D/C for self management. 3. Pt will demonstrate decreased edema in the left ankle by 3cm in order to aid in improved mobility necessary for ambulation and stair negotiation  4. Pt will demonstrate increased left ankle AROM into DF to > 5 degrees in order to assist with foot clearance throughout swing phase of gait  5. Pt will demonstrate 4/5 strength throughout the left ankle in order to aid in balance and stability while ambulating and stair climbing  6. Pt will be able to negotiate 3, 6in stairs with unilateral rail with reciprocal gait without CAM boot in order to enter/exit her home more easily  7. Pt will ambulate 250ft continuously without AD/LE CAM boot/LOB/pain in order to negotiate her community more safely.     Frequency / Duration:     Patient to be seen  2-3  times per week for 8-12  treatments:  Patient / Caregiver education and instruction: self care and exercises  G-Codes (GP):   Therapist Signature: Jasmine Jeong PT Date: 02/7/6658   Certification Period:  Time: 1:36 PM   ========================================================================  I certify that the above Physical Therapy Services are being furnished while the patient is under my care.  I agree with the treatment plan and certify that this therapy is necessary. Physician Signature:        Date:       Time:   Please sign and return to [facility] or you may fax the signed copy to [phone] . Thank you.

## 2018-11-02 NOTE — PROGRESS NOTES
PT ANKLE EVAL AND TREATMENT     Patient Name: Sherron Finely  Date:2018  : 1975  [x]  Patient  Verified  Payor: Garland Lujan / Plan: Kush Dan [id number] PPO / Product Type: PPO /    In [id number]  Out time:100  Total Treatment Time (min): 50  Total Timed Codes (min): 25  1:1 Treatment Time ( only): 50   Visit #: 1 of     Treatment Area: Pain in left ankle [M25.572]    SUBJECTIVE  Pain Level (0-10 on visual analog scale): 1  Any medication changes, allergies to medications, diagnosis change, or new procedure performed?: see summary sheet for update  Subjective functional status/changes:   [] No changes reported  Assessment / key information: Patient is a 38 y.o. female who presents to [facility] with a diagnosis of Pain in left ankle [M25.572]. Patient is her own historian. Living situation is as follows: lives with partner one story house with 3 CLAUDIA. Occupation: works in a bank. Pt presents s/p left Lisfranc fracture ORIF on 9/10/18 after falling over a tree root in early . Pt was NWBing LLE with CAM boot and foot scooter up until she saw her MD last wk. She has since been upgraded to [address] LLE with the CAM boot and is using a standard walker in and out of the house. She dons a left LE compression sleeve when ambulating to assist with a reduction in edema. She c/o numbness along the ventral surface of the left foot. Current Deficits include: pain, edema, decreased mobility, decreased strength, and decreased postural awareness with resulting limitations in ADL's and in functional abilities.      Impairments are as follows:     Pain 1/10 VAS ventral surface of left foot along incision; pt c/o numbness along the central surface of the left foot in no specific dermatomal pattern    Observations incision clean dry intact without s/s of infection; no erythema     Posture BLE genu recurvatum and valgus    Gait antalgic with FWBing LLE with CAM boot and step to gait with standard walker    AROM/PROM in degrees  DF R 10/15 L -5/0  PF R 40/45 L 40/43  Eversion R 15/20 L 15/18  Inversion R 30/34 L 10/12    Strength   Right ankle 5/5  Left ankle 3-/5 throughout   Left extensor hallucis longus 3-/5    Special Tests   Circumferential measures:  Mid gastrocs  L 32cm  R 62cm  Figure 8: L 65.5cm R 62cm  Negative Homans    Functional Tests   Unable to squat without valgus collapse     OBJECTIVE   Manual -  min    Rationale: provided to improve the patient's ability to     Gait Training: 15min with:  LLE FWBing with CAM boot and:  unilateral axillary crutch x25ft on level surface with step to gait with CS and VCs for sequencing  SC x25ft on level surface with step to gait and CGA and VCs for sequencing    Modality (rationale):   []  E-Stim: type _ x _ min     []att   []unatt   []w/US   []w/ice   []w/heat  []  Traction: []cerv   []pelvic   _ lbs x _ min     []pro   []sup   []int   []const  []  Ultrasound: []cont   []pulse    _ W/cm2 x _  min   []1MHz   []3MHz  []  Iontophoresis: []take home patch w/ dexamethazone    []40mA   []80mA                               []_ mA min w/: []dexamethazone   []other:_  []  Ice pack _  min     [] Hot pack _  min     [] Paraffin _  min  []  Other:   Rationale: provided to improve the patient's ability to     Patient Education: [x] Established HEP    [x] POT   (minutes) : 10min desensitization techniques; WBing instructions; HEP  Patient with verbalized and demonstrated understanding of HEP/POC. Rationale: provided to improve the patient's ability to perform transfers    Pain Level (0-10 scale) post treatment: 1     ASSESSMENT  [x]  See Plan of Care    PLAN  [x]  Upgrade activities as tolerated     [x] Other:_    See POC for Frequency/duration of visits     Pt instructed to continue using SW at home this wknd for ambulation with CAM boot and FWBing LLE in order to maximize safety until further gait training is performed.  She was with verbalized understanding.     Justification for Eval Code Complexity:   Patient History : see POC  Examination: (see exam)  Clinical Presentation: stable  Clinical Decision Making : FOTO : 45 /100     G-Codes (GP):     Mary Gaitan PT 11/2/2018  1:53 PM

## 2018-11-05 ENCOUNTER — HOSPITAL ENCOUNTER (OUTPATIENT)
Dept: PHYSICAL THERAPY | Age: 43
Discharge: HOME | End: 2018-11-05
Payer: COMMERCIAL

## 2018-11-05 PROCEDURE — 97112 NEUROMUSCULAR REEDUCATION: CPT

## 2018-11-05 PROCEDURE — 97140 MANUAL THERAPY 1/> REGIONS: CPT

## 2018-11-05 PROCEDURE — 97110 THERAPEUTIC EXERCISES: CPT

## 2018-11-05 NOTE — PROGRESS NOTES
PT DAILY TREATMENT NOTE 10-18    Patient Name: Ingrid Crowder  Date:2018  : 1975  [x]  Patient  Verified  Payor: Laura Malcolm / Plan: Erika Blevins / Product Type: HMO /    In time:800  Out time:838  Total Treatment Time (min): 38  Visit #: 2 of     Medicare/BCBS Only   Total Timed Codes (min):  38 1:1 Treatment Time:  38       Treatment Area: Pain in left ankle [M25.572]    SUBJECTIVE  Pain Level (0-10 scale): 1  Any medication changes, allergies to medications, adverse drug reactions, diagnosis change, or new procedure performed?: [x] No    [] Yes (see summary sheet for update)  Subjective functional status/changes:   [] No changes reported  \"My big toe hurts. \"    OBJECTIVE    Modality rationale: patient declined   Min Type Additional Details    [] Estim:  []Unatt       []IFC  []Premod                        []Other:  []w/ice   []w/heat  Position:  Location:    [] Estim: []Att    []TENS instruct  []NMES                    []Other:  []w/US   []w/ice   []w/heat  Position:  Location:    []  Traction: [] Cervical       []Lumbar                       [] Prone          []Supine                       []Intermittent   []Continuous Lbs:  [] before manual  [] after manual    []  Ultrasound: []Continuous   [] Pulsed                           []1MHz   []3MHz W/cm2:  Location:    []  Iontophoresis with dexamethasone         Location: [] Take home patch   [] In clinic    []  Ice     []  heat  []  Ice massage  []  Laser   []  Anodyne Position:  Location:    []  Laser with stim  []  Other:  Position:  Location:    []  Vasopneumatic Device Pressure:       [] lo [] med [] hi   Temperature: [] lo [] med [] hi   [] Skin assessment post-treatment:  []intact []redness- no adverse reaction    []redness - adverse reaction:     15 min Therapeutic Exercise:  [x] See flow sheet :   Rationale: increase ROM and increase strength to improve the patients ability to perform ADLs    15 min Neuromuscular Re-education:  [x]  See flow sheet : ankle/foot stabilization activities   Rationale: increase ROM, increase strength, improve coordination, improve balance and increase proprioception  to improve the patients ability to improve mobility, stance stability, and gait    8 min Manual Therapy:  PROM to great toe with gentle mobs, PROM of left ankle to improve DF and PF. Desensitization to left foot and great toe   Rationale: decrease pain, increase ROM, increase tissue extensibility, decrease trigger points and increase postural awareness to improve mobility and gait        With   [x] TE   [] TA   [x] neuro   [] other: Patient Education: [x] Review HEP    [] Progressed/Changed HEP based on:   [x] positioning   [x] body mechanics   [] transfers   [] heat/ice application    [] other:      Other Objective/Functional Measures:      Pain Level (0-10 scale) post treatment: 0    ASSESSMENT/Changes in Function: Initiated POC. Pt is hypersensitive at left great toe. Educated pt on perform desensitization techniques at home to improve this. Limited with great toe mobility. Advised pt to bring tennis shoe to her [place] as MD requests she transition out of boot. Patient will continue to benefit from skilled PT services to modify and progress therapeutic interventions, address functional mobility deficits, address ROM deficits, address strength deficits, analyze and address soft tissue restrictions, analyze and cue movement patterns, analyze and modify body mechanics/ergonomics, assess and modify postural abnormalities, address imbalance/dizziness and instruct in home and community integration to attain remaining goals. [x]  See Plan of Care  []  See progress note/recertification  []  See Discharge Summary         Progress towards goals / Updated goals: · Short Term Goals: To be accomplished in  2  treatments:  1. Pt will be compliant with HEP for symptom management at home. Reports compliance   · Long Term Goals:  To be accomplished in  8-12 treatments:  1. Pt will demonstrate an increased FOTO score to 65/100 in order to improve function  2. Pt will be independent with HEP at D/C for self management. 3. Pt will demonstrate decreased edema in the left ankle by 3cm in order to aid in improved mobility necessary for ambulation and stair negotiation  4. Pt will demonstrate increased left ankle AROM into DF to > 5 degrees in order to assist with foot clearance throughout swing phase of gait  5. Pt will demonstrate 4/5 strength throughout the left ankle in order to aid in balance and stability while ambulating and stair climbing  6. Pt will be able to negotiate 3, 6in stairs with unilateral rail with reciprocal gait without CAM boot in order to enter/exit her home more easily  7. Pt will ambulate 250ft continuously without AD/LE CAM boot/LOB/pain in order to negotiate her community more safely.       PLAN  []  Upgrade activities as tolerated     [x]  Continue plan of care  []  Update interventions per flow sheet       []  Discharge due to:_  []  Other:_      Ramiro Casas PTA, [facility] 11/5/2018  8:44 AM    Future Appointments   Date Time Provider Jessica Flor   11/7/2018  8:00 AM [facility]   11/9/2018  9:00 AM Jake Yanez PT [facility]   11/12/2018 10:00 AM YAKELIN [facility]SONDRA [facility]   11/14/2018  9:00 AM Domingo Harkins PTA [facility]   11/16/2018  9:00 AM Jake Yanez PT [facility]   11/19/2018 10:00 AM YAKELIN CuetoPTSONDRA [facility]   11/21/2018  9:00 AM Domingo Harkins PTA [facility]   1/11/2019  9:35 AM [facility] NURSE VISIT [facility] ISABELLA SCHED   1/18/2019  8:30 AM Emery Dow MD [facility]

## 2018-11-07 ENCOUNTER — HOSPITAL ENCOUNTER (OUTPATIENT)
Dept: PHYSICAL THERAPY | Age: 43
Discharge: HOME | End: 2018-11-07
Payer: COMMERCIAL

## 2018-11-07 PROCEDURE — 97110 THERAPEUTIC EXERCISES: CPT

## 2018-11-07 PROCEDURE — 97140 MANUAL THERAPY 1/> REGIONS: CPT

## 2018-11-07 PROCEDURE — 97112 NEUROMUSCULAR REEDUCATION: CPT

## 2018-11-07 NOTE — PROGRESS NOTES
PT DAILY TREATMENT NOTE 10-18    Patient Name: Collins Perbenjy  Date:2018  : 1975  [x]  Patient  Verified  Payor: Mimi Pleitez / Plan: Sera Velasquez / Product Type: HMO /    In time:800  Out time:843  Total Treatment Time (min): 43  Visit #: 3 of     Medicare/BCBS Only   Total Timed Codes (min):  43 1:1 Treatment Time:  43       Treatment Area: Pain in left ankle [M25.572]    SUBJECTIVE  Pain Level (0-10 scale): 0  Any medication changes, allergies to medications, adverse drug reactions, diagnosis change, or new procedure performed?: [x] No    [] Yes (see summary sheet for update)  Subjective functional status/changes:   [] No changes reported  \"I'm good as long as I have my boot on. \"    OBJECTIVE    Modality rationale: patient declined   Min Type Additional Details    [] Estim:  []Unatt       []IFC  []Premod                        []Other:  []w/ice   []w/heat  Position:  Location:    [] Estim: []Att    []TENS instruct  []NMES                    []Other:  []w/US   []w/ice   []w/heat  Position:  Location:    []  Traction: [] Cervical       []Lumbar                       [] Prone          []Supine                       []Intermittent   []Continuous Lbs:  [] before manual  [] after manual    []  Ultrasound: []Continuous   [] Pulsed                           []1MHz   []3MHz W/cm2:  Location:    []  Iontophoresis with dexamethasone         Location: [] Take home patch   [] In clinic    []  Ice     []  heat  []  Ice massage  []  Laser   []  Anodyne Position:  Location:    []  Laser with stim  []  Other:  Position:  Location:    []  Vasopneumatic Device Pressure:       [] lo [] med [] hi   Temperature: [] lo [] med [] hi   [] Skin assessment post-treatment:  []intact []redness- no adverse reaction    []redness - adverse reaction:     10 min Therapeutic Exercise:  [x] See flow sheet :   Rationale: increase ROM and increase strength to improve the patients ability to perform ADLs    25 min Neuromuscular Re-education:  [x]  See flow sheet : ankle/foot stabilization exercises   Rationale: increase ROM, increase strength, improve coordination, improve balance and increase proprioception  to improve the patients ability to improve mobility, stance stability, and gait    8 min Manual Therapy:  PROM to great toe with gentle mobs, PROM of left ankle to improve DF and PF. Desensitization to left foot and great toe   Rationale: decrease pain, increase ROM, increase tissue extensibility, decrease trigger points and increase postural awareness to improve mobility and gait        With   [x] TE   [] TA   [x] neuro   [] other: Patient Education: [x] Review HEP    [] Progressed/Changed HEP based on:   [x] positioning   [x] body mechanics   [] transfers   [] heat/ice application    [] other:      Other Objective/Functional Measures:      Pain Level (0-10 scale) post treatment: 0    ASSESSMENT/Changes in Function: Pt is making progress with her gait. She was able to ambulate around the clinic with her shoe without an increase in pain. She did well with weight shifting. Needs some cuing to improve push off. Remains limited with great toe motion. Reports improvements with sensation sensitivity since starting to perform desensitization techniques at home. Patient will continue to benefit from skilled PT services to modify and progress therapeutic interventions, address functional mobility deficits, address ROM deficits, address strength deficits, analyze and address soft tissue restrictions, analyze and cue movement patterns, analyze and modify body mechanics/ergonomics, assess and modify postural abnormalities, address imbalance/dizziness and instruct in home and community integration to attain remaining goals. [x]  See Plan of Care  []  See progress note/recertification  []  See Discharge Summary         Progress towards goals / Updated goals: · Short Term Goals: To be accomplished in  2  treatments:  1.  Pt will be compliant with HEP for symptom management at home. Reports compliance   · Long Term Goals: To be accomplished in  8-12  treatments:  1. Pt will demonstrate an increased FOTO score to 65/100 in order to improve function   Assess at 5th visit  2. Pt will be independent with HEP at D/C for self management. Reports compliance  3. Pt will demonstrate decreased edema in the left ankle by 3cm in order to aid in improved mobility necessary for ambulation and stair negotiation   Making progress with swelling  4. Pt will demonstrate increased left ankle AROM into DF to > 5 degrees in order to assist with foot clearance throughout swing phase of gait   Still lacking some DF  5. Pt will demonstrate 4/5 strength throughout the left ankle in order to aid in balance and stability while ambulating and stair climbing   Assess at later dater  6. Pt will be able to negotiate 3, 6in stairs with unilateral rail with reciprocal gait without CAM boot in order to enter/exit her home more easily   Initiated ambulation without her CAM boot today  7. Pt will ambulate 250ft continuously without AD/LE CAM boot/LOB/pain in order to negotiate her community more safely.    Ambulated 100+ ft with shoe and single crutch      PLAN  []  Upgrade activities as tolerated     [x]  Continue plan of care  []  Update interventions per flow sheet       []  Discharge due to:_  []  Other:_      Alida Lira PTA, [facility] 11/7/2018  8:51 AM    Future Appointments   Date Time Provider Jessica Flor   11/9/2018  9:00 AM Cinthya Paez, PT [facility] [address]   11/12/2018 10:00 AM Griselda Blackwell PTA [facility] [address]   11/14/2018  9:00 AM Griselda Blackwell PTA [facility] [address]   11/16/2018  9:00 AM Cinthya Paez, PT [facility] [address]   11/19/2018 10:00 AM Griselda Blackwell PTA [facility] [address]   11/21/2018  9:00 AM Griselda Blackwell PTA [facility] [address]   1/11/2019  9:35 AM [facility] NURSE VISIT [facility] ISABELLA TOSCANO   1/18/2019  8:30 AM Palak So MD [facility]

## 2018-11-09 ENCOUNTER — HOSPITAL ENCOUNTER (OUTPATIENT)
Dept: PHYSICAL THERAPY | Age: 43
Discharge: HOME | End: 2018-11-09
Payer: COMMERCIAL

## 2018-11-09 PROCEDURE — 97116 GAIT TRAINING THERAPY: CPT

## 2018-11-09 PROCEDURE — 97110 THERAPEUTIC EXERCISES: CPT

## 2018-11-09 PROCEDURE — 97140 MANUAL THERAPY 1/> REGIONS: CPT

## 2018-11-09 NOTE — PROGRESS NOTES
PT DAILY TREATMENT NOTE 10-18    Patient Name: Jennifer Aviles  Date:2018  : 1975  [x]  Patient  Verified  Payor: Escobar Vaughn / Plan: Sriram Burgess / Product Type: HMO /    In time:900  Out time:945  Total Treatment Time (min): 45  Visit #: 4 of     Medicare/BCBS Only   Total Timed Codes (min):  45 1:1 Treatment Time:  38       Treatment Area: Pain in left ankle [M25.572]    SUBJECTIVE  Pain Level (0-10 scale): 0  Any medication changes, allergies to medications, adverse drug reactions, diagnosis change, or new procedure performed?: [x] No    [] Yes (see summary sheet for update)  Subjective functional status/changes:   [] No changes reported  \"I'M DOING BETTER BUT WANT TO WORK ON IMPROVING MY WALKING. \"     OBJECTIVE    Modality rationale: patient declined   Min Type Additional Details    [] Estim:  []Unatt       []IFC  []Premod                        []Other:  []w/ice   []w/heat  Position:  Location:    [] Estim: []Att    []TENS instruct  []NMES                    []Other:  []w/US   []w/ice   []w/heat  Position:  Location:    []  Traction: [] Cervical       []Lumbar                       [] Prone          []Supine                       []Intermittent   []Continuous Lbs:  [] before manual  [] after manual    []  Ultrasound: []Continuous   [] Pulsed                           []1MHz   []3MHz W/cm2:  Location:    []  Iontophoresis with dexamethasone         Location: [] Take home patch   [] In clinic    []  Ice     []  heat  []  Ice massage  []  Laser   []  Anodyne Position:  Location:    []  Laser with stim  []  Other:  Position:  Location:    []  Vasopneumatic Device Pressure:       [] lo [] med [] hi   Temperature: [] lo [] med [] hi   [] Skin assessment post-treatment:  []intact []redness- no adverse reaction    []redness - adverse reaction:     22/15 min Therapeutic Exercise:  [x] See flow sheet :   Rationale: increase ROM and increase strength to improve the patients ability to perform ADLs min Neuromuscular Re-education:  [x]  See flow sheet : ankle/foot stabilization exercises   Rationale: increase ROM, increase strength, improve coordination, improve balance and increase proprioception  to improve the patients ability to improve mobility, stance stability, and gait    8min Gait training without any AD with manual cueing to assist with weightshifting onto the LLE in stance and VCs throughout for increased push off, stance time LLE, and reciprocal arm swing on level surface 50ft x 4 with CGA/CS    15 min Manual Therapy: desensitization techniques to the left foot/ankle; PROM left great toe MTP and IP joints with grade II distraction to tolerance and mobes into flexion; talocrural mobes into DF grade II   Rationale: decrease pain, increase ROM, increase tissue extensibility, decrease trigger points and increase postural awareness to improve mobility and gait        With   [x] TE   [] TA   [x] neuro   [] other: Patient Education: [x] Review HEP    [] Progressed/Changed HEP based on:   [x] positioning   [x] body mechanics   [] transfers   [] heat/ice application    [] other:      Other Objective/Functional Measures:       Pain Level (0-10 scale) post treatment: 0    ASSESSMENT/Changes in Function: Pt with improved push off after manual therapy and gait training with manual facilitation at left lateral pelvis to increase WBing in stance LLE. Patient will continue to benefit from skilled PT services to modify and progress therapeutic interventions, address functional mobility deficits, address ROM deficits, address strength deficits, analyze and address soft tissue restrictions, analyze and cue movement patterns, analyze and modify body mechanics/ergonomics, assess and modify postural abnormalities, address imbalance/dizziness and instruct in home and community integration to attain remaining goals.      [x]  See Plan of Care  []  See progress note/recertification  []  See Discharge Summary Progress towards goals / Updated goals: · Short Term Goals: To be accomplished in  2  treatments:  1. Pt will be compliant with HEP for symptom management at home. Reports compliance   · Long Term Goals: To be accomplished in  8-12  treatments:  1. Pt will demonstrate an increased FOTO score to 65/100 in order to improve function   Assess at 5th visit  2. Pt will be independent with HEP at D/C for self management. Reports compliance  3. Pt will demonstrate decreased edema in the left ankle by 3cm in order to aid in improved mobility necessary for ambulation and stair negotiation   Making progress with swelling  4. Pt will demonstrate increased left ankle AROM into DF to > 5 degrees in order to assist with foot clearance throughout swing phase of gait   Still lacking some DF  5. Pt will demonstrate 4/5 strength throughout the left ankle in order to aid in balance and stability while ambulating and stair climbing   Assess at later dater  6. Pt will be able to negotiate 3, 6in stairs with unilateral rail with reciprocal gait without CAM boot in order to enter/exit her home more easily   Initiated ambulation without her CAM boot today  7. Pt will ambulate 250ft continuously without AD/LE CAM boot/LOB/pain in order to negotiate her community more safely.    Ambulated 100+ ft with shoe and single crutch      PLAN  []  Upgrade activities as tolerated     [x]  Continue plan of care  []  Update interventions per flow sheet       []  Discharge due to:_  []  Other:_      Erik Laguna, PT, [facility] 11/9/2018  8:51 AM    Future Appointments   Date Time Provider Jessica Flor   11/12/2018 10:00 AM Tom Luog PTA [facility] [address]   11/14/2018  9:00 AM [facility] [address]   11/16/2018  9:00 AM [facility] [address]   11/19/2018 10:00 AM [facility] [address]   11/21/2018  9:00 AM [facility] [address]   1/11/2019  9:35 AM [facility] NURSE VISIT [address]   1/18/2019  8:30 AM Ronald Lindsay MD [address]

## 2018-11-14 ENCOUNTER — HOSPITAL ENCOUNTER (OUTPATIENT)
Dept: PHYSICAL THERAPY | Age: 43
Discharge: HOME | End: 2018-11-14
Payer: COMMERCIAL

## 2018-11-14 PROCEDURE — 97110 THERAPEUTIC EXERCISES: CPT

## 2018-11-14 PROCEDURE — 97112 NEUROMUSCULAR REEDUCATION: CPT

## 2018-11-16 ENCOUNTER — APPOINTMENT (OUTPATIENT)
Dept: PHYSICAL THERAPY | Age: 43
End: 2018-11-16
Payer: COMMERCIAL

## 2018-11-19 ENCOUNTER — APPOINTMENT (OUTPATIENT)
Dept: PHYSICAL THERAPY | Age: 43
End: 2018-11-19
Payer: COMMERCIAL

## 2018-11-19 NOTE — PROGRESS NOTES
[facility] [address]  [address] [facility] [address], [address] [phone] fax    Discharge Summary  Patient Name: Chance Iverson : 1975   Medical   Diagnosis: Pain in left ankle [M25.572] Treatment Diagnosis: Pain in left ankle [M25.572]   Onset Date: 9/10/18       Referral Source: Trace Cuenca DPM Start of Care [facility]): 2018   Prior Hospitalization: See medical history Provider #: [id number]   Prior Level of Function: Independent ambulator   Comorbidities: S/p left ankle ORIF for Lisfranc fx 9/10/18; appendectomy; gallbladder removal; tubal ligation; left ankle fx in    Medications: Verified on Patient Summary List     Visits from Start of Care: 5    Missed Visits: 2    Reporting Period : 18 to 18    · Short Term Goals: To be accomplished in  2  treatments:  1. Pt will be compliant with HEP for symptom management at home.              Reports compliance    MET  · Long Term Goals: To be accomplished in  8-12  treatments:  1. Pt will demonstrate an increased FOTO score to 65/100 in order to improve function              Assess at 5th visit   NOT MET  2.  Pt will be independent with HEP at D/C for self management.              Reports compliance   MET  3. Pt will demonstrate decreased edema in the left ankle by 3cm in order to aid in improved mobility necessary for ambulation and stair negotiation              Making progress with swelling   NOT MET  4. Pt will demonstrate increased left ankle AROM into DF to > 5 degrees in order to assist with foot clearance throughout swing phase of gait              Still lacking some DF   NOT MET  5. Pt will demonstrate 4/5 strength throughout the left ankle in order to aid in balance and stability while ambulating and stair climbing              Assess at later dater    NOT MET  6. Pt will be able to negotiate 3, 6in stairs with unilateral rail with reciprocal gait without CAM boot in order to enter/exit her home more easily              Initiated ambulation without her CAM boot today   NOT MET  7. Pt will ambulate 250ft continuously without AD/LE CAM boot/LOB/pain in order to negotiate her community more safely.              Ambulated 100+ ft with shoe and single crutch   NOT MET      Assessment/Summary of care: Ms. Leah Doyle has been a pleasure to treat and has progressed towards her goals. At this time, she has been restricted from continuing therapy secondary to her insurance. We will discharged to an updated HEP at this time.     RECOMMENDATIONS:  [x]Discontinue therapy: []Patient has reached or is progressing toward set goals      [x]Patient has abdicated      []Due to lack of appreciable progress towards set goals    Jessi Rivera, PT 11/19/2018 3:39 PM

## 2018-11-21 ENCOUNTER — APPOINTMENT (OUTPATIENT)
Dept: PHYSICAL THERAPY | Age: 43
End: 2018-11-21
Payer: COMMERCIAL

## 2019-06-06 ENCOUNTER — OFFICE VISIT (OUTPATIENT)
Dept: INTERNAL MEDICINE CLINIC | Age: 44
End: 2019-06-06

## 2019-06-06 ENCOUNTER — HOSPITAL ENCOUNTER (OUTPATIENT)
Dept: LAB | Age: 44
Discharge: HOME | End: 2019-06-06
Payer: COMMERCIAL

## 2019-06-06 VITALS
BODY MASS INDEX: 43.43 KG/M2 | DIASTOLIC BLOOD PRESSURE: 73 MMHG | TEMPERATURE: 97.1 F | OXYGEN SATURATION: 96 % | WEIGHT: 254.4 LBS | SYSTOLIC BLOOD PRESSURE: 117 MMHG | HEART RATE: 80 BPM | RESPIRATION RATE: 12 BRPM | HEIGHT: 64 IN

## 2019-06-06 DIAGNOSIS — R73.03 PRE-DIABETES: ICD-10-CM

## 2019-06-06 DIAGNOSIS — H81.13 BENIGN PAROXYSMAL POSITIONAL VERTIGO DUE TO BILATERAL VESTIBULAR DISORDER: Primary | ICD-10-CM

## 2019-06-06 DIAGNOSIS — E66.01 OBESITY, MORBID (HCC): ICD-10-CM

## 2019-06-06 DIAGNOSIS — K75.81 NASH (NONALCOHOLIC STEATOHEPATITIS): ICD-10-CM

## 2019-06-06 DIAGNOSIS — Z78.9 ALCOHOL USE: ICD-10-CM

## 2019-06-06 DIAGNOSIS — R42 DIZZINESS: ICD-10-CM

## 2019-06-06 DIAGNOSIS — F17.200 NICOTINE DEPENDENCE, UNCOMPLICATED, UNSPECIFIED NICOTINE PRODUCT TYPE: ICD-10-CM

## 2019-06-06 LAB
BASOPHILS # BLD: 0 K/UL (ref 0–0.1)
BASOPHILS NFR BLD: 0 % (ref 0–2)
DIFFERENTIAL METHOD BLD: ABNORMAL
EOSINOPHIL # BLD: 0.3 K/UL (ref 0–0.4)
EOSINOPHIL NFR BLD: 4 % (ref 0–5)
ERYTHROCYTE [DISTWIDTH] IN BLOOD BY AUTOMATED COUNT: 13.9 % (ref 11.6–14.5)
HCT VFR BLD AUTO: 43.8 % (ref 35–45)
HGB BLD-MCNC: 14.5 G/DL (ref 12–16)
LYMPHOCYTES # BLD: 2.8 K/UL (ref 0.9–3.6)
LYMPHOCYTES NFR BLD: 39 % (ref 21–52)
MCH RBC QN AUTO: 30.1 PG (ref 24–34)
MCHC RBC AUTO-ENTMCNC: 33.1 G/DL (ref 31–37)
MCV RBC AUTO: 91.1 FL (ref 74–97)
MONOCYTES # BLD: 0.6 K/UL (ref 0.05–1.2)
MONOCYTES NFR BLD: 8 % (ref 3–10)
NEUTS SEG # BLD: 3.6 K/UL (ref 1.8–8)
NEUTS SEG NFR BLD: 49 % (ref 40–73)
PLATELET # BLD AUTO: 256 K/UL (ref 135–420)
PMV BLD AUTO: 12.1 FL (ref 9.2–11.8)
RBC # BLD AUTO: 4.81 M/UL (ref 4.2–5.3)
WBC # BLD AUTO: 7.4 K/UL (ref 4.6–13.2)

## 2019-06-06 PROCEDURE — 85025 COMPLETE CBC W/AUTO DIFF WBC: CPT

## 2019-06-06 PROCEDURE — 36415 COLL VENOUS BLD VENIPUNCTURE: CPT

## 2019-06-06 PROCEDURE — 80061 LIPID PANEL: CPT

## 2019-06-06 PROCEDURE — 83036 HEMOGLOBIN GLYCOSYLATED A1C: CPT

## 2019-06-06 PROCEDURE — 80053 COMPREHEN METABOLIC PANEL: CPT

## 2019-06-06 RX ORDER — MECLIZINE HYDROCHLORIDE 25 MG/1
TABLET ORAL
Refills: 0 | COMMUNITY
Start: 2019-06-02

## 2019-06-06 RX ORDER — MECLIZINE HYDROCHLORIDE 25 MG/1
25 TABLET ORAL
COMMUNITY
Start: 2019-06-02 | End: 2019-06-06 | Stop reason: SDUPTHER

## 2019-06-06 RX ORDER — ONDANSETRON 4 MG/1
TABLET, FILM COATED ORAL
Refills: 1 | COMMUNITY
Start: 2019-06-02

## 2019-06-06 RX ORDER — METHYLPREDNISOLONE 4 MG/1
TABLET ORAL
Refills: 0 | COMMUNITY
Start: 2019-05-23 | End: 2019-06-06 | Stop reason: SINTOL

## 2019-06-06 RX ORDER — ONDANSETRON 4 MG/1
8 TABLET, FILM COATED ORAL
COMMUNITY
Start: 2019-06-02 | End: 2019-06-06 | Stop reason: SDUPTHER

## 2019-06-06 NOTE — PROGRESS NOTES
[facility]:  6/6/2019, MRN: [id number]      Romero Barrera is a 41 y.o. female and presents to clinic for ED Follow-up (back on 6-02-19 went to a [facility] in [place] location for Dizziness that had been going on 3 days) and Dizziness    Subjective: The patient is a 53-year-old female with history of diabetes, IBS, nicotine dependence (smoking since the age of 14, 1 pack lasts 3 days), alcoholic beverage consumption (can drink up to 12 beers in one day) and ARCINIEGA. 1. Prediabetes: Her last A1c was 5.9 in 2018. She is not regularly exercising or dieting. Her weight is 254lbs today. She is participating in the weight watchers program but admits to not sticking to the program.    2. ARCINIEGA: Present for over 6 months. She is presently consuming alcoholic beverages, drinking a 12 pack on a Saturday. She does not drink alcoholic beverages outside of the weekend. She has not had LFTs checked since last year. 3.  Nicotine Dependence: She has been smoking since age 14. She is presently smoking 1 pack of cigarettes per day. No SOB/cough sx.     4.  Dizziness: She was seen at an urgent care facility on June 2 after having a 3-day history of dizziness. She was given a rx for a medrol dose pack. Dizziness sx worsened with this rx. She is taking meclizine tid which was also prescribed. Sx improves with this rx but sx return as the medication wears off. Sx are off/on. Not associated with SOB, palpitations, and CP. Triggers: Changes in position. Alleviating factors: Meclizine as mentioned above. Aggravating factors: Standing positions. No hearing loss. No ear pain. No fever/chills.   No tinnitus      Patient Active Problem List    Diagnosis Date Noted    Obesity, morbid ([facility] [address] 03/15/2018    Nicotine dependence 11/03/2017    ARCINIEGA (nonalcoholic steatohepatitis) 02/10/2015    Alcohol use 02/10/2015    Pre-diabetes 02/10/2015    IBS (irritable bowel syndrome) 02/02/2015       Current Outpatient Medications Medication Sig Dispense Refill    meclizine (ANTIVERT) 25 mg tablet TAKE 1 TABLET BY MOUTH 3 TIMES A DAY AS NEEDED FOR DIZZINESS FOR UP TO 10 DAYS.  0    ondansetron hcl (ZOFRAN) 4 mg tablet TAKE 2 TABLETS BY MOUTH 2 TIMES A DAY AS NEEDED FOR NAUSEA FOR UP TO 4 DOSES. 1       Allergies   Allergen Reactions    Prednisone Other (comments)     Dizziness while on Medrol Prednisone pack        Past Medical History:   Diagnosis Date    Abnormal Pap smear of cervix     [facility]    Elevated LFTs     EtOH dependence ([facility] [address]     Fatty liver     Irritable bowel syndrome (IBS)     2012, normal colonoscopy    Smoker        Past Surgical History:   Procedure Laterality Date    HX APPENDECTOMY  age 18    HX CHOLECYSTECTOMY  20    HX COLONOSCOPY  2012    normal    HX GYN  2007    BTL       Family History   Adopted: Yes   Problem Relation Age of Onset    Diabetes Maternal Grandmother        Social History     Tobacco Use    Smoking status: Current Some Day Smoker     Packs/day: 1.00     Years: 15.00     Pack years: 15.00     Types: Cigarettes    Smokeless tobacco: Never Used   Substance Use Topics    Alcohol use: Yes     Alcohol/week: 12.0 oz     Types: 24 Cans of beer per week     Comment: 6 pack 4 times a week       ROS   Review of Systems   Constitutional: Negative for chills and fever. HENT: Negative for ear pain, hearing loss and sore throat. Eyes: Negative for blurred vision and pain. Respiratory: Negative for cough and shortness of breath. Cardiovascular: Negative for chest pain. Gastrointestinal: Negative for abdominal pain, blood in stool and melena. Genitourinary: Negative for dysuria and hematuria. Musculoskeletal: Negative for joint pain and myalgias. Skin: Negative for rash. Neurological: Positive for dizziness. Negative for tingling and focal weakness. Endo/Heme/Allergies: Does not bruise/bleed easily. Psychiatric/Behavioral: Negative for substance abuse. Objective     Vitals:    06/06/19 1114   BP: 117/73   Pulse: 80   Resp: 12   Temp: 97.1 °F (36.2 °C)   TempSrc: Oral   SpO2: 96%   Weight: 254 lb 6.4 oz (115.4 kg)   Height: 5' 4\" (1.626 m)   PainSc:   0 - No pain   LMP: 05/30/2019       Physical Exam   Constitutional: She is oriented to person, place, and time and well-developed, well-nourished, and in no distress. HENT:   Head: Normocephalic and atraumatic. Right Ear: External ear normal.   Left Ear: External ear normal.   Nose: Nose normal.   Mouth/Throat: Oropharynx is clear and moist. No oropharyngeal exudate. Clear TMs   Eyes: Pupils are equal, round, and reactive to light. Conjunctivae and EOM are normal. Right eye exhibits no discharge. Left eye exhibits no discharge. No scleral icterus. Neck: Neck supple. Cardiovascular: Normal rate, regular rhythm, normal heart sounds and intact distal pulses. Exam reveals no gallop and no friction rub. No murmur heard. Pulmonary/Chest: Effort normal and breath sounds normal. No respiratory distress. She has no wheezes. She has no rales. Abdominal: Soft. Bowel sounds are normal. She exhibits no distension. There is no tenderness. There is no rebound and no guarding. Musculoskeletal: She exhibits no edema or tenderness (BUE). Lymphadenopathy:     She has no cervical adenopathy. Neurological: She is alert and oriented to person, place, and time. She exhibits normal muscle tone. Gait normal.   Skin: Skin is warm and dry. No erythema. Psychiatric: Affect normal.   Nursing note and vitals reviewed.       LABS   Data Review:   Lab Results   Component Value Date/Time    WBC 4.6 03/15/2018 09:09 AM    HGB 14.7 03/15/2018 09:09 AM    HCT 44.6 03/15/2018 09:09 AM    PLATELET 692 95/54/3239 09:09 AM    MCV 95.3 03/15/2018 09:09 AM       Lab Results   Component Value Date/Time    Sodium 139 03/15/2018 09:09 AM    Potassium 4.1 03/15/2018 09:09 AM    Chloride 104 03/15/2018 09:09 AM    CO2 26 03/15/2018 09:09 AM    Anion gap 9 03/15/2018 09:09 AM    Glucose 122 (H) 03/15/2018 09:09 AM    BUN 9 03/15/2018 09:09 AM    Creatinine 0.64 03/15/2018 09:09 AM    BUN/Creatinine ratio 14 03/15/2018 09:09 AM    GFR est AA >60 03/15/2018 09:09 AM    GFR est non-AA >60 03/15/2018 09:09 AM    Calcium 8.9 03/15/2018 09:09 AM       Lab Results   Component Value Date/Time    Cholesterol, total 131 03/15/2018 09:09 AM    HDL Cholesterol 41 03/15/2018 09:09 AM    LDL, calculated 68.2 03/15/2018 09:09 AM    VLDL, calculated 21.8 03/15/2018 09:09 AM    Triglyceride 109 03/15/2018 09:09 AM    CHOL/HDL Ratio 3.2 03/15/2018 09:09 AM       Lab Results   Component Value Date/Time    Hemoglobin A1c 5.9 (H) 03/15/2018 09:09 AM       Assessment/Plan:   1. ARCINIEGA: +ETOH beverage consumption.  -I discouraged her from consuming alcoholic beverages.  -Checking a CMP, A1c, and a lipid panel. ORDERS:  - METABOLIC PANEL, COMPREHENSIVE; Future  - HEMOGLOBIN A1C W/O EAG; Future  - LIPID PANEL; Future    2. Pre-diabetes:   -Checking an A1c and a CMP. -I encouraged her to reduce her weight by limiting her processed food intake. We also discussed how alcoholic beverages have empty calories. I will recheck her weight at her follow-up appointment. ORDERS:  - METABOLIC PANEL, COMPREHENSIVE; Future  - HEMOGLOBIN A1C W/O EAG; Future    3. Dizziness: Likely from BPPV per PE findings and hx.  -Checking a CMP and a CBC.  -Placing a referral to ENT per patient request.  -I encouraged her to stay hydrated with water. ORDERS:  - METABOLIC PANEL, COMPREHENSIVE; Future  - CBC WITH AUTOMATED DIFF; Future  - REFERRAL TO ENT-OTOLARYNGOLOGY      Health Maintenance Due   Topic Date Due    Pneumococcal 0-64 years (1 of 1 - PPSV23) 12/08/1983     Lab review: labs are reviewed in the EHR and ordered as mentioned above. I have discussed the diagnosis with the patient and the intended plan as seen in the above orders.   The patient has received an after-visit summary and questions were answered concerning future plans. I have discussed medication side effects and warnings with the patient as well. I have reviewed the plan of care with the patient, accepted their input and they are in agreement with the treatment goals. All questions were answered. The patient understands the plan of care. Handouts provided today with above information. Pt instructed if symptoms worsen to call the office or report to the ED for continued care. Greater than 50% of the visit time was spent in counseling and/or coordination of care. Voice recognition was used to generate this report, which may have resulted in some phonetic based errors in grammar and contents. Even though attempts were made to correct all the mistakes, some may have been missed, and remained in the body of the document.           Di Chahal MD

## 2019-06-06 NOTE — PROGRESS NOTES
Chief Complaint   Patient presents with    ED Follow-up     back on 6-02-19 went to a [facility] in [place] for Dizziness that had been going on 3 days    Dizziness       1. Have you been to the ER, urgent care clinic since your last visit? Hospitalized since your last visit? Yes When: 6-02-19 Where: [facility] facility located in [facility] Reason:  Dizziness that had been going on for 3 days    2. Have you seen or consulted any other health care providers outside of the [address] [place] [address] since your last visit? Include any pap smears or colon screening. No    Patient was given a copy of the Advanced Directive and understands to bring it in once completed.   Health Maintenance Due   Topic Date Due    Pneumococcal 0-64 years (1 of 1 - PPSV23) 12/08/1983

## 2019-06-06 NOTE — PATIENT INSTRUCTIONS
Patient was given a copy of the Advanced Medical Directive Form, and understands to bring it in once completed. Health Maintenance Due   Topic Date Due    Pneumococcal 0-64 years (1 of 1 - PPSV23) 12/08/1983          Dizziness: Care Instructions  Your Care Instructions  Dizziness is the feeling of unsteadiness or fuzziness in your head. It is different than having vertigo, which is a feeling that the room is spinning or that you are moving or falling. It is also different from lightheadedness, which is the feeling that you are about to faint. It can be hard to know what causes dizziness. Some people feel dizzy when they have migraine headaches. Sometimes bouts of flu can make you feel dizzy. Some medical conditions, such as heart problems or high blood pressure, can make you feel dizzy. Many medicines can cause dizziness, including medicines for high blood pressure, pain, or anxiety. If a medicine causes your symptoms, your doctor may recommend that you stop or change the medicine. If it is a problem with your heart, you may need medicine to help your heart work better. If there is no clear reason for your symptoms, your doctor may suggest watching and waiting for a while to see if the dizziness goes away on its own. Follow-up care is a key part of your treatment and safety. Be sure to make and go to all appointments, and call your doctor if you are having problems. It's also a good idea to know your test results and keep a list of the medicines you take. How can you care for yourself at home? · If your doctor recommends or prescribes medicine, take it exactly as directed. Call your doctor if you think you are having a problem with your medicine. · Do not drive while you feel dizzy. · Try to prevent falls. Steps you can take include:  ? Using nonskid mats, adding grab bars near the tub, and using night-lights. ? Clearing your home so that walkways are free of anything you might trip on.  ?  Letting family and friends know that you have been feeling dizzy. This will help them know how to help you. When should you call for help? Call 911 anytime you think you may need emergency care. For example, call if:    · You passed out (lost consciousness).     · You have dizziness along with symptoms of a heart attack. These may include:  ? Chest pain or pressure, or a strange feeling in the chest.  ? Sweating. ? Shortness of breath. ? Nausea or vomiting. ? Pain, pressure, or a strange feeling in the back, neck, jaw, or upper belly or in one or both shoulders or arms. ? Lightheadedness or sudden weakness. ? A fast or irregular heartbeat.     · You have symptoms of a stroke. These may include:  ? Sudden numbness, tingling, weakness, or loss of movement in your face, arm, or leg, especially on only one side of your body. ? Sudden vision changes. ? Sudden trouble speaking. ? Sudden confusion or trouble understanding simple statements. ? Sudden problems with walking or balance. ? A sudden, severe headache that is different from past headaches.    Call your doctor now or seek immediate medical care if:    · You feel dizzy and have a fever, headache, or ringing in your ears.     · You have new or increased nausea and vomiting.     · Your dizziness does not go away or comes back.    Watch closely for changes in your health, and be sure to contact your doctor if:    · You do not get better as expected. Where can you learn more? Go to http://stalin-jarocho.info/. Enter U998 in the search box to learn more about \"Dizziness: Care Instructions. \"  Current as of: September 23, 2018  Content Version: 11.9  © 6689-3407 [employer]. Care instructions adapted under license by [employer] (which disclaims liability or warranty for this information).  If you have questions about a medical condition or this instruction, always ask your healthcare professional. [address]  any warranty or liability for your use of this information.

## 2019-06-07 LAB
ALBUMIN SERPL-MCNC: 3.6 G/DL (ref 3.4–5)
ALBUMIN/GLOB SERPL: 1 {RATIO} (ref 0.8–1.7)
ALP SERPL-CCNC: 118 U/L (ref 45–117)
ALT SERPL-CCNC: 38 U/L (ref 13–56)
ANION GAP SERPL CALC-SCNC: 8 MMOL/L (ref 3–18)
AST SERPL-CCNC: 21 U/L (ref 15–37)
BILIRUB SERPL-MCNC: 0.6 MG/DL (ref 0.2–1)
BUN SERPL-MCNC: 11 MG/DL (ref 7–18)
BUN/CREAT SERPL: 17 (ref 12–20)
CALCIUM SERPL-MCNC: 8.7 MG/DL (ref 8.5–10.1)
CHLORIDE SERPL-SCNC: 103 MMOL/L (ref 100–108)
CHOLEST SERPL-MCNC: 163 MG/DL
CO2 SERPL-SCNC: 29 MMOL/L (ref 21–32)
CREAT SERPL-MCNC: 0.63 MG/DL (ref 0.6–1.3)
GLOBULIN SER CALC-MCNC: 3.5 G/DL (ref 2–4)
GLUCOSE SERPL-MCNC: 95 MG/DL (ref 74–99)
HBA1C MFR BLD: 5.8 % (ref 4.2–5.6)
HDLC SERPL-MCNC: 60 MG/DL (ref 40–60)
HDLC SERPL: 2.7 {RATIO} (ref 0–5)
LDLC SERPL CALC-MCNC: 80.4 MG/DL (ref 0–100)
LIPID PROFILE,FLP: NORMAL
POTASSIUM SERPL-SCNC: 4.4 MMOL/L (ref 3.5–5.5)
PROT SERPL-MCNC: 7.1 G/DL (ref 6.4–8.2)
SODIUM SERPL-SCNC: 140 MMOL/L (ref 136–145)
TRIGL SERPL-MCNC: 113 MG/DL (ref ?–150)
VLDLC SERPL CALC-MCNC: 22.6 MG/DL

## 2019-06-12 ENCOUNTER — TELEPHONE (OUTPATIENT)
Dept: INTERNAL MEDICINE CLINIC | Age: 44
End: 2019-06-12

## 2019-06-12 NOTE — PROGRESS NOTES
Please let her know that her liver tests and renal tests are unremarkable. Her A1c is down to 5.8 from 5.9. She is to continue lowering her carb intake to prevent progression to type 2 diabetes. Her total cholesterol is 163. Her triglycerides are 113. Her HDL is 60. Her LDL is 80.4. Her CBC is unremarkable. No additional medications are warranted for these findings.     Dr. Birder Cushing  [facility], [address] [place] [address], [address] [place] [address] Str.  Phone: [phone]  Fax: [phone]

## 2019-06-14 NOTE — TELEPHONE ENCOUNTER
Patient contacted, patient identified with two identifiers (Name & ). Patient aware of results per DR. Parada and verbalizes understanding. Patient states that she discussed swelling of her foot on her last appointment. She would like to know what to do moving forward. Please advise.

## 2019-06-16 NOTE — TELEPHONE ENCOUNTER
We can order compression stockings if she would like for BLE edema. She should also elevate her legs to relieve edema. Let her know that her BP at her last apt is not high enough in which she can tolerate additional rx to relieve BLE edema. As a result, she needs to make lifestyle modification changes as mentioned above for venous insufficiency. All of this was discussed at her last apt.     Dr. Tom Mendiola  [facility] [address], [address] [place] [address] Str.  Phone: [phone]  Fax: [phone]

## 2019-06-17 NOTE — TELEPHONE ENCOUNTER
Patient contacted, patient identified with two identifiers (Name & ). Patient given instructions per DR. Brad Coelho. Patient states that she will also call the surgeons office for further recommendations.

## 2019-07-18 ENCOUNTER — TELEPHONE (OUTPATIENT)
Dept: INTERNAL MEDICINE CLINIC | Age: 44
End: 2019-07-18

## 2020-10-28 ENCOUNTER — HOSPITAL ENCOUNTER (OUTPATIENT)
Dept: MAMMOGRAPHY | Age: 45
Discharge: HOME | End: 2020-10-28
Attending: OBSTETRICS & GYNECOLOGY
Payer: COMMERCIAL

## 2020-10-28 DIAGNOSIS — Z12.31 SCREENING MAMMOGRAM, ENCOUNTER FOR: ICD-10-CM

## 2020-10-28 PROCEDURE — 77063 BREAST TOMOSYNTHESIS BI: CPT

## 2021-09-24 NOTE — ACP (ADVANCE CARE PLANNING)
Patient was given a copy of the Advanced Medical Directive Form, and understands to bring it in once completed.
Statement Selected

## 2021-10-08 ENCOUNTER — TRANSCRIBE ORDER (OUTPATIENT)
Dept: SCHEDULING | Age: 46
End: 2021-10-08

## 2021-10-08 DIAGNOSIS — Z12.31 VISIT FOR SCREENING MAMMOGRAM: Primary | ICD-10-CM

## 2021-10-30 ENCOUNTER — HOSPITAL ENCOUNTER (OUTPATIENT)
Dept: MAMMOGRAPHY | Age: 46
Discharge: HOME | End: 2021-10-30
Attending: INTERNAL MEDICINE
Payer: COMMERCIAL

## 2021-10-30 DIAGNOSIS — Z12.31 VISIT FOR SCREENING MAMMOGRAM: ICD-10-CM

## 2021-10-30 PROCEDURE — 77063 BREAST TOMOSYNTHESIS BI: CPT

## 2021-11-08 ENCOUNTER — TELEPHONE (OUTPATIENT)
Dept: INTERNAL MEDICINE CLINIC | Age: 46
End: 2021-11-08

## 2022-03-19 PROBLEM — E66.01 OBESITY, MORBID (HCC): Status: ACTIVE | Noted: 2018-03-15

## 2022-03-19 PROBLEM — F17.200 NICOTINE DEPENDENCE: Status: ACTIVE | Noted: 2017-11-03

## 2022-06-07 ENCOUNTER — DOCUMENTATION ONLY (OUTPATIENT)
Dept: INTERNAL MEDICINE CLINIC | Age: 47
End: 2022-06-07

## 2022-06-07 NOTE — PROGRESS NOTES
Pt is transferring care to [facility], records request received which has been forwarded to Ci for processing

## 2022-10-14 ENCOUNTER — TRANSCRIBE ORDER (OUTPATIENT)
Dept: SCHEDULING | Age: 47
End: 2022-10-14

## 2022-10-14 DIAGNOSIS — Z12.31 VISIT FOR SCREENING MAMMOGRAM: Primary | ICD-10-CM

## 2022-12-02 ENCOUNTER — HOSPITAL ENCOUNTER (OUTPATIENT)
Dept: MAMMOGRAPHY | Age: 47
Discharge: HOME | End: 2022-12-02
Attending: STUDENT IN AN ORGANIZED HEALTH CARE EDUCATION/TRAINING PROGRAM
Payer: COMMERCIAL

## 2022-12-02 DIAGNOSIS — Z12.31 VISIT FOR SCREENING MAMMOGRAM: ICD-10-CM

## 2022-12-02 PROCEDURE — 77063 BREAST TOMOSYNTHESIS BI: CPT

## 2023-11-16 ENCOUNTER — TRANSCRIBE ORDERS (OUTPATIENT)
Facility: HOSPITAL | Age: 48
End: 2023-11-16

## 2023-11-16 DIAGNOSIS — Z12.31 SCREENING MAMMOGRAM, ENCOUNTER FOR: Primary | ICD-10-CM
